# Patient Record
Sex: FEMALE | Race: WHITE | Employment: OTHER | URBAN - METROPOLITAN AREA
[De-identification: names, ages, dates, MRNs, and addresses within clinical notes are randomized per-mention and may not be internally consistent; named-entity substitution may affect disease eponyms.]

---

## 2020-02-03 ENCOUNTER — APPOINTMENT (OUTPATIENT)
Dept: CT IMAGING | Age: 70
End: 2020-02-03
Payer: MEDICARE

## 2020-02-03 ENCOUNTER — HOSPITAL ENCOUNTER (EMERGENCY)
Age: 70
Discharge: HOME | End: 2020-02-03
Payer: MEDICARE

## 2020-02-03 VITALS
SYSTOLIC BLOOD PRESSURE: 117 MMHG | RESPIRATION RATE: 18 BRPM | WEIGHT: 127 LBS | HEART RATE: 88 BPM | OXYGEN SATURATION: 98 % | DIASTOLIC BLOOD PRESSURE: 78 MMHG | BODY MASS INDEX: 22.5 KG/M2 | TEMPERATURE: 98.2 F | HEIGHT: 63 IN

## 2020-02-03 LAB
ALBUMIN SERPL-MCNC: 4.9 G/DL (ref 3.5–4.6)
ALP BLD-CCNC: 81 U/L (ref 40–130)
ALT SERPL-CCNC: 45 U/L (ref 0–33)
ANION GAP SERPL CALCULATED.3IONS-SCNC: 11 MEQ/L (ref 9–15)
AST SERPL-CCNC: 61 U/L (ref 0–35)
BASOPHILS ABSOLUTE: 0.1 K/UL (ref 0–0.2)
BASOPHILS RELATIVE PERCENT: 1.1 %
BILIRUB SERPL-MCNC: 0.4 MG/DL (ref 0.2–0.7)
BILIRUBIN URINE: NEGATIVE
BLOOD, URINE: NEGATIVE
BUN BLDV-MCNC: 9 MG/DL (ref 8–23)
CALCIUM SERPL-MCNC: 9.4 MG/DL (ref 8.5–9.9)
CHLORIDE BLD-SCNC: 106 MEQ/L (ref 95–107)
CLARITY: CLEAR
CO2: 28 MEQ/L (ref 20–31)
COLOR: YELLOW
CREAT SERPL-MCNC: 0.43 MG/DL (ref 0.5–0.9)
EKG ATRIAL RATE: 80 BPM
EKG P AXIS: 51 DEGREES
EKG P-R INTERVAL: 152 MS
EKG Q-T INTERVAL: 394 MS
EKG QRS DURATION: 74 MS
EKG QTC CALCULATION (BAZETT): 454 MS
EKG R AXIS: -7 DEGREES
EKG T AXIS: 33 DEGREES
EKG VENTRICULAR RATE: 80 BPM
EOSINOPHILS ABSOLUTE: 0 K/UL (ref 0–0.7)
EOSINOPHILS RELATIVE PERCENT: 0.1 %
GFR AFRICAN AMERICAN: >60
GFR NON-AFRICAN AMERICAN: >60
GLOBULIN: 3.2 G/DL (ref 2.3–3.5)
GLUCOSE BLD-MCNC: 98 MG/DL (ref 70–99)
GLUCOSE URINE: NEGATIVE MG/DL
HCT VFR BLD CALC: 42.3 % (ref 37–47)
HEMOGLOBIN: 14.7 G/DL (ref 12–16)
KETONES, URINE: NEGATIVE MG/DL
LEUKOCYTE ESTERASE, URINE: NEGATIVE
LIPASE: 36 U/L (ref 12–95)
LYMPHOCYTES ABSOLUTE: 1.3 K/UL (ref 1–4.8)
LYMPHOCYTES RELATIVE PERCENT: 26.5 %
MCH RBC QN AUTO: 34.7 PG (ref 27–31.3)
MCHC RBC AUTO-ENTMCNC: 34.6 % (ref 33–37)
MCV RBC AUTO: 100.2 FL (ref 82–100)
MONOCYTES ABSOLUTE: 0.4 K/UL (ref 0.2–0.8)
MONOCYTES RELATIVE PERCENT: 7.4 %
NEUTROPHILS ABSOLUTE: 3.2 K/UL (ref 1.4–6.5)
NEUTROPHILS RELATIVE PERCENT: 64.9 %
NITRITE, URINE: NEGATIVE
PDW BLD-RTO: 13.8 % (ref 11.5–14.5)
PH UA: 7 (ref 5–9)
PLATELET # BLD: 215 K/UL (ref 130–400)
POC CREATININE WHOLE BLOOD: 0.8
POTASSIUM SERPL-SCNC: 4.1 MEQ/L (ref 3.4–4.9)
PROTEIN UA: NEGATIVE MG/DL
RBC # BLD: 4.22 M/UL (ref 4.2–5.4)
SODIUM BLD-SCNC: 145 MEQ/L (ref 135–144)
SPECIFIC GRAVITY UA: 1.09 (ref 1–1.03)
TOTAL PROTEIN: 8.1 G/DL (ref 6.3–8)
TROPONIN: <0.01 NG/ML (ref 0–0.01)
URINE REFLEX TO CULTURE: NORMAL
UROBILINOGEN, URINE: 0.2 E.U./DL
WBC # BLD: 4.9 K/UL (ref 4.8–10.8)

## 2020-02-03 PROCEDURE — 99284 EMERGENCY DEPT VISIT MOD MDM: CPT

## 2020-02-03 PROCEDURE — 74177 CT ABD & PELVIS W/CONTRAST: CPT

## 2020-02-03 PROCEDURE — 84484 ASSAY OF TROPONIN QUANT: CPT

## 2020-02-03 PROCEDURE — 6360000004 HC RX CONTRAST MEDICATION: Performed by: PHYSICIAN ASSISTANT

## 2020-02-03 PROCEDURE — 85025 COMPLETE CBC W/AUTO DIFF WBC: CPT

## 2020-02-03 PROCEDURE — 6360000002 HC RX W HCPCS: Performed by: PHYSICIAN ASSISTANT

## 2020-02-03 PROCEDURE — 36415 COLL VENOUS BLD VENIPUNCTURE: CPT

## 2020-02-03 PROCEDURE — 81003 URINALYSIS AUTO W/O SCOPE: CPT

## 2020-02-03 PROCEDURE — 80053 COMPREHEN METABOLIC PANEL: CPT

## 2020-02-03 PROCEDURE — 71275 CT ANGIOGRAPHY CHEST: CPT

## 2020-02-03 PROCEDURE — 93005 ELECTROCARDIOGRAM TRACING: CPT | Performed by: PHYSICIAN ASSISTANT

## 2020-02-03 PROCEDURE — 96375 TX/PRO/DX INJ NEW DRUG ADDON: CPT

## 2020-02-03 PROCEDURE — 96374 THER/PROPH/DIAG INJ IV PUSH: CPT

## 2020-02-03 PROCEDURE — 83690 ASSAY OF LIPASE: CPT

## 2020-02-03 RX ORDER — SODIUM CHLORIDE 0.9 % (FLUSH) 0.9 %
10 SYRINGE (ML) INJECTION ONCE
Status: DISCONTINUED | OUTPATIENT
Start: 2020-02-03 | End: 2020-02-03 | Stop reason: HOSPADM

## 2020-02-03 RX ORDER — HYDROCODONE BITARTRATE AND ACETAMINOPHEN 5; 325 MG/1; MG/1
1 TABLET ORAL EVERY 6 HOURS PRN
Qty: 12 TABLET | Refills: 0 | Status: SHIPPED | OUTPATIENT
Start: 2020-02-03 | End: 2020-02-06

## 2020-02-03 RX ORDER — ONDANSETRON 2 MG/ML
4 INJECTION INTRAMUSCULAR; INTRAVENOUS
Status: COMPLETED | OUTPATIENT
Start: 2020-02-03 | End: 2020-02-03

## 2020-02-03 RX ORDER — KETOROLAC TROMETHAMINE 15 MG/ML
15 INJECTION, SOLUTION INTRAMUSCULAR; INTRAVENOUS ONCE
Status: COMPLETED | OUTPATIENT
Start: 2020-02-03 | End: 2020-02-03

## 2020-02-03 RX ORDER — MORPHINE SULFATE 2 MG/ML
2 INJECTION, SOLUTION INTRAMUSCULAR; INTRAVENOUS
Status: COMPLETED | OUTPATIENT
Start: 2020-02-03 | End: 2020-02-03

## 2020-02-03 RX ADMIN — MORPHINE SULFATE 2 MG: 2 INJECTION, SOLUTION INTRAMUSCULAR; INTRAVENOUS at 16:02

## 2020-02-03 RX ADMIN — IOPAMIDOL 100 ML: 755 INJECTION, SOLUTION INTRAVENOUS at 16:24

## 2020-02-03 RX ADMIN — KETOROLAC TROMETHAMINE 15 MG: 15 INJECTION, SOLUTION INTRAMUSCULAR; INTRAVENOUS at 16:02

## 2020-02-03 RX ADMIN — ONDANSETRON 4 MG: 2 INJECTION INTRAMUSCULAR; INTRAVENOUS at 16:01

## 2020-02-03 ASSESSMENT — PAIN DESCRIPTION - DESCRIPTORS: DESCRIPTORS: SHOOTING

## 2020-02-03 ASSESSMENT — ENCOUNTER SYMPTOMS
BACK PAIN: 1
VOICE CHANGE: 0
ABDOMINAL DISTENTION: 0
COLOR CHANGE: 1
APNEA: 0
ANAL BLEEDING: 0
PHOTOPHOBIA: 0
ABDOMINAL PAIN: 1
NAUSEA: 0
VOMITING: 0
EYE DISCHARGE: 0

## 2020-02-03 ASSESSMENT — PAIN SCALES - GENERAL
PAINLEVEL_OUTOF10: 8
PAINLEVEL_OUTOF10: 8

## 2020-02-03 ASSESSMENT — PAIN DESCRIPTION - FREQUENCY: FREQUENCY: INTERMITTENT

## 2020-02-03 ASSESSMENT — PAIN DESCRIPTION - LOCATION: LOCATION: FLANK

## 2020-02-03 ASSESSMENT — PAIN DESCRIPTION - PAIN TYPE: TYPE: ACUTE PAIN

## 2020-02-03 ASSESSMENT — PAIN DESCRIPTION - ORIENTATION: ORIENTATION: RIGHT

## 2020-02-03 NOTE — ED PROVIDER NOTES
[address] [facility] ED  eMERGENCY dEPARTMENT eNCOUnter      Pt Name: Anel Mcdonough  MRN: [id number]  [place] 1950  Date of evaluation: 2/3/2020  Provider: Saeed Valdez Dr       Chief Complaint   Patient presents with    Flank Pain     right flank pain that started in the middle of the night         HISTORY OF PRESENT ILLNESS   (Location/Symptom, Timing/Onset,Context/Setting, Quality, Duration, Modifying Factors, Severity)  Note limiting factors. Anel Mcdonough is a 71 y.o. female who presents to the emergency department presents with right-sided back flank and abdominal pain denies any injury denies fever chills nausea vomiting. Denies any falls denies history of kidney stones denies bleeding with urination rectal bleeding. States this started last night worse with touch or motion nothing improves symptoms. HPI    NursingNotes were reviewed. REVIEW OF SYSTEMS    (2-9 systems for level 4, 10 or more for level 5)     Review of Systems   Constitutional: Negative for activity change, appetite change, fever and unexpected weight change. HENT: Negative for ear discharge, nosebleeds and voice change. Eyes: Negative for photophobia and discharge. Respiratory: Negative for apnea. Cardiovascular: Negative for chest pain. Gastrointestinal: Positive for abdominal pain. Negative for abdominal distention, anal bleeding, nausea and vomiting. Endocrine: Negative for cold intolerance, heat intolerance and polyphagia. Genitourinary: Positive for flank pain. Negative for genital sores. Musculoskeletal: Positive for back pain. Negative for joint swelling. Skin: Positive for color change. Negative for pallor. Allergic/Immunologic: Negative for immunocompromised state. Neurological: Negative for seizures and facial asymmetry. Hematological: Does not bruise/bleed easily. Psychiatric/Behavioral: Negative for behavioral problems, self-injury and sleep disturbance.    All other systems reviewed and are negative. Except as noted above the remainder of the review of systems was reviewed and negative. PAST MEDICAL HISTORY   No past medical history on file. SURGICALHISTORY     No past surgical history on file. CURRENT MEDICATIONS       Previous Medications    No medications on file       ALLERGIES     Clindamycin/lincomycin    FAMILY HISTORY     No family history on file.        SOCIAL HISTORY       Social History     Socioeconomic History    Marital status:      Spouse name: Not on file    Number of children: Not on file    Years of education: Not on file    Highest education level: Not on file   Occupational History    Not on file   Social Needs    Financial resource strain: Not on file    Food insecurity:     Worry: Not on file     Inability: Not on file    Transportation needs:     Medical: Not on file     Non-medical: Not on file   Tobacco Use    Smoking status: Not on file   Substance and Sexual Activity    Alcohol use: Not on file    Drug use: Not on file    Sexual activity: Not on file   Lifestyle    Physical activity:     Days per week: Not on file     Minutes per session: Not on file    Stress: Not on file   Relationships    Social connections:     Talks on phone: Not on file     Gets together: Not on file     Attends Congregation service: Not on file     Active member of club or organization: Not on file     Attends meetings of clubs or organizations: Not on file     Relationship status: Not on file    Intimate partner violence:     Fear of current or ex partner: Not on file     Emotionally abused: Not on file     Physically abused: Not on file     Forced sexual activity: Not on file   Other Topics Concern    Not on file   Social History Narrative    Not on file       SCREENINGS      @[facility]([id number])@      PHYSICAL EXAM    (up to 7 for level 4, 8 or more for level 5)     ED Triage Vitals [02/03/20 1500]   BP Temp Temp Source Pulse Resp SpO2 Height Weight   118/73 98.2 °F (36.8 °C) Oral 91 18 96 % 5' 3\" (1.6 m) 127 lb (57.6 kg)       Physical Exam  Vitals signs and nursing note reviewed. Constitutional:       General: She is not in acute distress. Appearance: She is well-developed. HENT:      Head: Normocephalic and atraumatic. Right Ear: Tympanic membrane normal.      Left Ear: Tympanic membrane normal.      Nose: Nose normal.      Mouth/Throat:      Mouth: Mucous membranes are moist.   Eyes:      General:         Right eye: No discharge. Left eye: No discharge. Pupils: Pupils are equal, round, and reactive to light. Neck:      Musculoskeletal: Normal range of motion and neck supple. Cardiovascular:      Rate and Rhythm: Normal rate and regular rhythm. Pulses: Normal pulses. Heart sounds: Normal heart sounds. Pulmonary:      Effort: Pulmonary effort is normal. No respiratory distress. Breath sounds: Normal breath sounds. No stridor. No wheezing or rhonchi. Abdominal:      General: Bowel sounds are normal. There is no distension. Palpations: Abdomen is soft. Tenderness: There is no abdominal tenderness. Musculoskeletal: Normal range of motion. General: Tenderness present. Arms:    Skin:     General: Skin is warm. Findings: Bruising present. No erythema. Neurological:      Mental Status: She is alert and oriented to person, place, and time. Psychiatric:         Mood and Affect: Mood normal.         DIAGNOSTIC RESULTS     EKG: All EKG's are interpreted by the Emergency Department Physician who either signs or Co-signsthis chart in the absence of a cardiologist.    EKG normal sinus rhythm rate 80neg ST segment elevation.     RADIOLOGY:   Non-plain filmimages such as CT, Ultrasound and MRI are read by the radiologist. Plain radiographic images are visualized and preliminarily interpreted by the emergency physician with the below findings:    See radiology reports. Interpretation per the Radiologist below, if available at the time ofthis note:    CTA Chest W WO  (PE study)   Final Result      No CT evidence of pulmonary embolism. No acute process in the thorax. Chronic-appearing compression deformity of L2.               CT ABDOMEN PELVIS W IV CONTRAST Additional Contrast? None   Final Result      No hydronephrosis or nephrolithiasis or other acute process in the abdomen/pelvis. Chronic appearing compression deformities of L2 and L3 as detailed  (counting reference is T12 as last rib bearing vertebral body, with lumbarization of S1 and last well formed disc space S1-S2). .               ==========               ED BEDSIDE ULTRASOUND:   Performed by ED Physician - none    LABS:  Labs Reviewed   CBC WITH AUTO DIFFERENTIAL - Abnormal; Notable for the following components:       Result Value    .2 (*)     MCH 34.7 (*)     All other components within normal limits   COMPREHENSIVE METABOLIC PANEL - Abnormal; Notable for the following components:    Sodium 145 (*)     CREATININE 0.43 (*)     Total Protein 8.1 (*)     Alb 4.9 (*)     ALT 45 (*)     AST 61 (*)     All other components within normal limits   POCT CREATININE - URINE - Normal   URINE RT REFLEX TO CULTURE   TROPONIN   LIPASE       All other labs were within normal range or not returned as of this dictation.     EMERGENCY DEPARTMENT COURSE and DIFFERENTIAL DIAGNOSIS/MDM:   Vitals:    Vitals:    02/03/20 1500 02/03/20 1741   BP: 118/73 117/78   Pulse: 91 88   Resp: 18 18   Temp: 98.2 °F (36.8 °C)    TempSrc: Oral    SpO2: 96% 98%   Weight: 127 lb (57.6 kg)    Height: 5' 3\" (1.6 m)             MDM  Number of Diagnoses or Management Options  Acute right-sided low back pain, unspecified whether sciatica present:   Contusion of right side of back, initial encounter:   Dehydration:   Right flank pain:   Diagnosis management comments: Patient noted to have a bruise subsequent exams it is

## 2020-02-03 NOTE — ED NOTES
Patient states unable to give urine refused anything invasive.  Will try soon     Yuliana Huang RN  02/03/20 0066

## 2020-02-03 NOTE — ED NOTES
Patient to ct via cart. Tolerated well. No acute distress noted.       Kendrick Newton  02/03/20 2970

## 2020-02-04 LAB
GFR AFRICAN AMERICAN: >60
GFR NON-AFRICAN AMERICAN: >60
PERFORMED ON: NORMAL
POC CREATININE: 0.8 MG/DL (ref 0.6–1.2)
POC SAMPLE TYPE: NORMAL

## 2020-02-05 PROCEDURE — 93010 ELECTROCARDIOGRAM REPORT: CPT | Performed by: INTERNAL MEDICINE

## 2020-02-18 ASSESSMENT — ENCOUNTER SYMPTOMS
ABDOMINAL DISTENTION: 0
COLOR CHANGE: 1
VOMITING: 0
BACK PAIN: 1
ABDOMINAL PAIN: 1
ANAL BLEEDING: 0
PHOTOPHOBIA: 0
APNEA: 0
NAUSEA: 0
VOICE CHANGE: 0
EYE DISCHARGE: 0

## 2023-06-02 PROBLEM — R35.0 URINARY FREQUENCY: Status: ACTIVE | Noted: 2023-06-02

## 2023-06-02 PROBLEM — R87.613 HGSIL ON PAP SMEAR OF CERVIX: Status: ACTIVE | Noted: 2023-06-02

## 2023-06-02 PROBLEM — R94.31 ABNORMAL ELECTROCARDIOGRAPHY: Status: ACTIVE | Noted: 2023-06-02

## 2023-06-02 PROBLEM — Z96.652 STATUS POST LEFT KNEE REPLACEMENT: Status: ACTIVE | Noted: 2023-06-02

## 2023-06-02 PROBLEM — R92.8 ABNORMAL MAMMOGRAM: Status: ACTIVE | Noted: 2023-06-02

## 2023-06-02 PROBLEM — M81.0 OSTEOPOROSIS: Status: ACTIVE | Noted: 2023-06-02

## 2023-06-02 PROBLEM — M54.9 BACK PAIN: Status: ACTIVE | Noted: 2023-06-02

## 2023-06-02 PROBLEM — M25.561 RIGHT KNEE PAIN: Status: ACTIVE | Noted: 2023-06-02

## 2023-06-02 PROBLEM — D75.89 MACROCYTOSIS: Status: ACTIVE | Noted: 2023-06-02

## 2023-06-02 PROBLEM — N39.41 URGE INCONTINENCE: Status: ACTIVE | Noted: 2023-06-02

## 2023-06-02 PROBLEM — R53.83 FATIGUE: Status: ACTIVE | Noted: 2023-06-02

## 2023-06-02 PROBLEM — J45.909 AB (ASTHMATIC BRONCHITIS) (HHS-HCC): Status: ACTIVE | Noted: 2023-06-02

## 2023-06-02 PROBLEM — K57.90 DIVERTICULOSIS: Status: ACTIVE | Noted: 2023-06-02

## 2023-06-02 PROBLEM — K63.5 COLON POLYPS: Status: ACTIVE | Noted: 2023-06-02

## 2023-06-02 PROBLEM — E78.5 HYPERLIPIDEMIA: Status: ACTIVE | Noted: 2023-06-02

## 2023-06-02 PROBLEM — M20.42 ACQUIRED HAMMERTOE OF LEFT FOOT: Status: ACTIVE | Noted: 2023-06-02

## 2023-06-02 PROBLEM — N32.81 OAB (OVERACTIVE BLADDER): Status: ACTIVE | Noted: 2023-06-02

## 2023-06-02 PROBLEM — K51.90 ULCERATIVE COLITIS (MULTI): Status: ACTIVE | Noted: 2023-06-02

## 2023-06-02 RX ORDER — PNV NO.95/FERROUS FUM/FOLIC AC 28MG-0.8MG
TABLET ORAL
COMMUNITY
Start: 2015-12-24

## 2023-06-02 RX ORDER — MULTIVITAMIN
TABLET ORAL
COMMUNITY

## 2023-06-02 RX ORDER — MESALAMINE 0.38 G/1
3 CAPSULE, EXTENDED RELEASE ORAL DAILY
COMMUNITY

## 2023-06-02 RX ORDER — FERROUS SULFATE 325(65) MG
1 TABLET ORAL DAILY
COMMUNITY
Start: 2017-09-05

## 2023-06-02 RX ORDER — ALENDRONATE SODIUM 70 MG/1
TABLET ORAL
COMMUNITY
Start: 2019-05-23 | End: 2023-07-28 | Stop reason: SDUPTHER

## 2023-06-02 RX ORDER — ATORVASTATIN CALCIUM 10 MG/1
1 TABLET, FILM COATED ORAL NIGHTLY
COMMUNITY
Start: 2019-05-07 | End: 2023-07-28 | Stop reason: SDUPTHER

## 2023-06-05 ENCOUNTER — OFFICE VISIT (OUTPATIENT)
Dept: PRIMARY CARE | Facility: CLINIC | Age: 73
End: 2023-06-05
Payer: MEDICARE

## 2023-06-05 VITALS
WEIGHT: 137 LBS | TEMPERATURE: 98.2 F | DIASTOLIC BLOOD PRESSURE: 72 MMHG | BODY MASS INDEX: 25.06 KG/M2 | SYSTOLIC BLOOD PRESSURE: 136 MMHG

## 2023-06-05 DIAGNOSIS — Z78.0 POSTMENOPAUSAL: ICD-10-CM

## 2023-06-05 DIAGNOSIS — M81.0 OSTEOPOROSIS, UNSPECIFIED OSTEOPOROSIS TYPE, UNSPECIFIED PATHOLOGICAL FRACTURE PRESENCE: ICD-10-CM

## 2023-06-05 DIAGNOSIS — Z00.00 HEALTH CARE MAINTENANCE: Primary | ICD-10-CM

## 2023-06-05 DIAGNOSIS — Z12.31 ENCOUNTER FOR SCREENING MAMMOGRAM FOR MALIGNANT NEOPLASM OF BREAST: ICD-10-CM

## 2023-06-05 DIAGNOSIS — E78.5 HYPERLIPIDEMIA, UNSPECIFIED HYPERLIPIDEMIA TYPE: ICD-10-CM

## 2023-06-05 DIAGNOSIS — R00.2 PALPITATIONS: ICD-10-CM

## 2023-06-05 PROBLEM — J45.909 AB (ASTHMATIC BRONCHITIS) (HHS-HCC): Status: RESOLVED | Noted: 2023-06-02 | Resolved: 2023-06-05

## 2023-06-05 PROBLEM — R53.83 FATIGUE: Status: RESOLVED | Noted: 2023-06-02 | Resolved: 2023-06-05

## 2023-06-05 LAB
POC HDL CHOLESTEROL: 63 MG/DL (ref 0–50)
POC LDL CHOLESTEROL: 57 MG/DL (ref 0–100)
POC NON-HDL CHOLESTEROL: 73 MG/DL (ref 0–130)
POC TOTAL CHOLESTEROL/HDL RATIO: 2.2 (ref 0–4.5)
POC TOTAL CHOLESTEROL: 137 MG/DL (ref 0–199)
POC TRIGLYCERIDES: 80 MG/DL (ref 0–150)

## 2023-06-05 PROCEDURE — 99214 OFFICE O/P EST MOD 30 MIN: CPT | Performed by: FAMILY MEDICINE

## 2023-06-05 PROCEDURE — 93000 ELECTROCARDIOGRAM COMPLETE: CPT | Performed by: FAMILY MEDICINE

## 2023-06-05 PROCEDURE — 80061 LIPID PANEL: CPT | Performed by: FAMILY MEDICINE

## 2023-06-05 PROCEDURE — 1160F RVW MEDS BY RX/DR IN RCRD: CPT | Performed by: FAMILY MEDICINE

## 2023-06-05 PROCEDURE — 1159F MED LIST DOCD IN RCRD: CPT | Performed by: FAMILY MEDICINE

## 2023-06-05 PROCEDURE — 1036F TOBACCO NON-USER: CPT | Performed by: FAMILY MEDICINE

## 2023-06-05 NOTE — PATIENT INSTRUCTIONS
Follow up fasting (no alcohol for 48 hours and just water for 14 hours) in six months for your next routine appointment.  In general, take any medications on schedule (except for types of Insulin).

## 2023-06-05 NOTE — PROGRESS NOTES
Subjective   Patient ID: [id number]     Rosalba Lewis is a 73 y.o. female who presents for Med Management.    HPI  Is concerned about systolic's in the 140's and gettingpolpitations after eating and in the middle of the night  Review of Systems  Cardiovascular- No chest pain or pressure, nausea, diaphoresis, paresthesias, dizziness, or syncope with or without exertion;  getting palpitations;  has given up caffeine but still doing herbal teas etc.  GEN-denies, fever, weakness or myalgias; no unexplained fever or chills  OPTH-No dry eyes, itchy eyes, or blurry vision   ENT-No hearing loss, tinnitus or vertigo;  ha a lot of phlegm in the morning  NECK-no stiffness, swelling or pain  PSYCH-No complaints regarding libido, appetite, memory or concentration;  no drug use or alcohol usage over six per week;  not getting lost  and no issues with numbers  SLEEP-No complaints of insomnia, apnea or restless legs  ALL/IMMUN-No history of sneezing or itching  HEM-No unexplained bruising or bleeding    Objective     /72 (BP Location: Left arm, Patient Position: Sitting)   Temp 36.8 °C (98.2 °F) (Oral)   Wt 62.1 kg (137 lb)   BMI 25.06 kg/m²      Physical Exam  Eyes-pupils equal round, reactive to light and accommodation, fundi with normal cup/disc ratio, conjunctiva without redness or discharge  General- well defined, well nourished, well hydrated individual in NAD  Skin- normal color and turgor; without nail pitting  Head-normocephalic without masses or tenderness  Ears-normal pinnae, and canals, with normal landmarks and light reflex of tympanic membranes bilaterally  Nose-septum in the midline, normal mucosa bilaterally  Throat- without erythema or exudate, uvula in midlineNeck-supple without lymphadenopathy or thyromegaly; no carotid bruits  Heart- regular rate and rhythm, normal s1 and s2 without murmur or gallop  Lungs-clear to auscultation  Abdomen-soft, positive bowel sounds, without masses, HSmegaly or pain    Assessment/Plan     Problem List Items Addressed This Visit          Circulatory    Palpitations    Relevant Orders    Stress Test Only    ECG 12 lead       Musculoskeletal    Osteoporosis       Other    Hyperlipidemia    Relevant Orders    POCT Lipid Panel manually resulted    Encounter for screening mammogram for malignant neoplasm of breast - Primary    Relevant Orders    BI mammo bilateral screening tomosynthesis    Postmenopausal    Relevant Orders    XR DEXA bone density     Follow up fasting (no alcohol for 48 hours and just water for 14 hours) in six months for your next routine appointment.  In general, take any medications on schedule (except for types of Insulin).    Prieto Patricia MD

## 2023-06-12 ENCOUNTER — APPOINTMENT (OUTPATIENT)
Dept: PRIMARY CARE | Facility: CLINIC | Age: 73
End: 2023-06-12
Payer: MEDICARE

## 2023-07-17 ENCOUNTER — TELEPHONE (OUTPATIENT)
Dept: PRIMARY CARE | Facility: CLINIC | Age: 73
End: 2023-07-17
Payer: MEDICARE

## 2023-07-17 DIAGNOSIS — R92.8 ABNORMAL MAMMOGRAM: Primary | ICD-10-CM

## 2023-07-19 NOTE — TELEPHONE ENCOUNTER
I contacted pt and reviewed mammogram result and need for bilateral diagnostic mammogram, order placed. Pt to call and schedule,  Thank you,  Keyla Waller, DO

## 2023-07-19 NOTE — TELEPHONE ENCOUNTER
Pt is Dr. RUIZ 's pt and Dr. MCKEON was covering yesterday for him and he left a message for the pt to call back. Pt is calling back can you please review pts mammogram and call her with the results. Thank you.

## 2023-07-22 DIAGNOSIS — N64.89 ASYMMETRICAL BREASTS: Primary | ICD-10-CM

## 2023-07-28 ENCOUNTER — TELEPHONE (OUTPATIENT)
Dept: PRIMARY CARE | Facility: CLINIC | Age: 73
End: 2023-07-28
Payer: MEDICARE

## 2023-07-28 DIAGNOSIS — E78.5 HYPERLIPIDEMIA, UNSPECIFIED HYPERLIPIDEMIA TYPE: ICD-10-CM

## 2023-07-28 DIAGNOSIS — M81.0 OSTEOPOROSIS, UNSPECIFIED OSTEOPOROSIS TYPE, UNSPECIFIED PATHOLOGICAL FRACTURE PRESENCE: ICD-10-CM

## 2023-07-28 RX ORDER — ALENDRONATE SODIUM 70 MG/1
70 TABLET ORAL
Qty: 12 TABLET | Refills: 0 | Status: SHIPPED | OUTPATIENT
Start: 2023-07-28 | End: 2023-12-28 | Stop reason: SDUPTHER

## 2023-07-28 RX ORDER — ATORVASTATIN CALCIUM 10 MG/1
10 TABLET, FILM COATED ORAL NIGHTLY
Qty: 90 TABLET | Refills: 0 | Status: SHIPPED | OUTPATIENT
Start: 2023-07-28 | End: 2023-11-04 | Stop reason: SDUPTHER

## 2023-07-28 NOTE — TELEPHONE ENCOUNTER
Refill    Alendronate (fosamax) 70 mg tablet, take 1 tablet once weekly  LR: 12/12/22 84 days 1 refill    Atorvastatin (lipitor) 10 mg tablet, take 1 tablet by mouth everyday at bedtime  LR: 12/12/22 90 days 1 refill    Pharm: [facility]   [phone]    LV: 6/12/23  NV: 12/5/23

## 2023-10-17 ENCOUNTER — TELEPHONE (OUTPATIENT)
Dept: OBSTETRICS AND GYNECOLOGY | Facility: CLINIC | Age: 73
End: 2023-10-17
Payer: MEDICARE

## 2023-10-17 NOTE — TELEPHONE ENCOUNTER
Pt was last seen 6/5/19 She has a hx of abnormal paps and a Leep. 2019 Pap was Neg, Neg. Pt called to see if she is due for an appt. She stated that she is having occ minor pelvic discomfort and is sexually active again with a new partner. Pt was advised to schedule an appt. For JANNA and to discuss her concerns. Transferred to the  for an appt.

## 2023-11-03 ENCOUNTER — TELEPHONE (OUTPATIENT)
Dept: PRIMARY CARE | Facility: CLINIC | Age: 73
End: 2023-11-03
Payer: MEDICARE

## 2023-11-03 DIAGNOSIS — E78.5 HYPERLIPIDEMIA, UNSPECIFIED HYPERLIPIDEMIA TYPE: ICD-10-CM

## 2023-11-03 RX ORDER — ATORVASTATIN CALCIUM 10 MG/1
10 TABLET, FILM COATED ORAL NIGHTLY
Qty: 90 TABLET | Refills: 0 | OUTPATIENT
Start: 2023-11-03

## 2023-11-03 NOTE — TELEPHONE ENCOUNTER
Refill:    Atorvastatin 10mg daily    Pharm: [facility] # [phone]    LR: 07/28/23 qty 90 no refills  LV: 06/05/23  NV: 12/08/23

## 2023-11-06 RX ORDER — ATORVASTATIN CALCIUM 10 MG/1
10 TABLET, FILM COATED ORAL NIGHTLY
Qty: 90 TABLET | Refills: 1 | Status: SHIPPED | OUTPATIENT
Start: 2023-11-06 | End: 2024-05-04

## 2023-12-05 ENCOUNTER — APPOINTMENT (OUTPATIENT)
Dept: PRIMARY CARE | Facility: CLINIC | Age: 73
End: 2023-12-05
Payer: MEDICARE

## 2023-12-08 ENCOUNTER — APPOINTMENT (OUTPATIENT)
Dept: PRIMARY CARE | Facility: CLINIC | Age: 73
End: 2023-12-08
Payer: MEDICARE

## 2023-12-12 ENCOUNTER — OFFICE VISIT (OUTPATIENT)
Dept: DERMATOLOGY | Facility: CLINIC | Age: 73
End: 2023-12-12
Payer: MEDICARE

## 2023-12-12 DIAGNOSIS — L81.4 LENTIGO: ICD-10-CM

## 2023-12-12 DIAGNOSIS — L57.8 PHOTOAGING OF SKIN: ICD-10-CM

## 2023-12-12 DIAGNOSIS — Z12.83 ENCOUNTER FOR SCREENING FOR MALIGNANT NEOPLASM OF SKIN: Primary | ICD-10-CM

## 2023-12-12 DIAGNOSIS — L57.0 ACTINIC KERATOSIS: ICD-10-CM

## 2023-12-12 DIAGNOSIS — D48.5 NEOPLASM OF UNCERTAIN BEHAVIOR OF SKIN: ICD-10-CM

## 2023-12-12 DIAGNOSIS — L82.1 SEBORRHEIC KERATOSIS: ICD-10-CM

## 2023-12-12 DIAGNOSIS — D18.01 HEMANGIOMA OF SKIN: ICD-10-CM

## 2023-12-12 PROCEDURE — 17000 DESTRUCT PREMALG LESION: CPT | Performed by: DERMATOLOGY

## 2023-12-12 PROCEDURE — 1159F MED LIST DOCD IN RCRD: CPT | Performed by: DERMATOLOGY

## 2023-12-12 PROCEDURE — 99203 OFFICE O/P NEW LOW 30 MIN: CPT | Performed by: DERMATOLOGY

## 2023-12-12 PROCEDURE — 1036F TOBACCO NON-USER: CPT | Performed by: DERMATOLOGY

## 2023-12-12 PROCEDURE — 1160F RVW MEDS BY RX/DR IN RCRD: CPT | Performed by: DERMATOLOGY

## 2023-12-12 ASSESSMENT — DERMATOLOGY QUALITY OF LIFE (QOL) ASSESSMENT
WHAT SINGLE SKIN CONDITION LISTED BELOW IS THE PATIENT ANSWERING THE QUALITY-OF-LIFE ASSESSMENT QUESTIONS ABOUT: NONE OF THE ABOVE
ARE THERE EXCLUSIONS OR EXCEPTIONS FOR THE QUALITY OF LIFE ASSESSMENT: NO
WHAT SINGLE SKIN CONDITION LISTED BELOW IS THE PATIENT ANSWERING THE QUALITY-OF-LIFE ASSESSMENT QUESTIONS ABOUT: NONE OF THE ABOVE
RATE HOW EMOTIONALLY BOTHERED YOU ARE BY YOUR SKIN PROBLEM (FOR EXAMPLE, WORRY, EMBARRASSMENT, FRUSTRATION): 0 - NEVER BOTHERED
RATE HOW BOTHERED YOU ARE BY SYMPTOMS OF YOUR SKIN PROBLEM (EG, ITCHING, STINGING BURNING, HURTING OR SKIN IRRITATION): 1
RATE HOW BOTHERED YOU ARE BY EFFECTS OF YOUR SKIN PROBLEMS ON YOUR ACTIVITIES (EG, GOING OUT, ACCOMPLISHING WHAT YOU WANT, WORK ACTIVITIES OR YOUR RELATIONSHIPS WITH OTHERS): 0 - NEVER BOTHERED
RATE HOW BOTHERED YOU ARE BY SYMPTOMS OF YOUR SKIN PROBLEM (EG, ITCHING, STINGING BURNING, HURTING OR SKIN IRRITATION): 1
RATE HOW BOTHERED YOU ARE BY EFFECTS OF YOUR SKIN PROBLEMS ON YOUR ACTIVITIES (EG, GOING OUT, ACCOMPLISHING WHAT YOU WANT, WORK ACTIVITIES OR YOUR RELATIONSHIPS WITH OTHERS): 0 - NEVER BOTHERED
RATE HOW EMOTIONALLY BOTHERED YOU ARE BY YOUR SKIN PROBLEM (FOR EXAMPLE, WORRY, EMBARRASSMENT, FRUSTRATION): 0 - NEVER BOTHERED
RATE HOW BOTHERED YOU ARE BY EFFECTS OF YOUR SKIN PROBLEMS ON YOUR ACTIVITIES (EG, GOING OUT, ACCOMPLISHING WHAT YOU WANT, WORK ACTIVITIES OR YOUR RELATIONSHIPS WITH OTHERS): 0 - NEVER BOTHERED
WHAT SINGLE SKIN CONDITION LISTED BELOW IS THE PATIENT ANSWERING THE QUALITY-OF-LIFE ASSESSMENT QUESTIONS ABOUT: NONE OF THE ABOVE
RATE HOW BOTHERED YOU ARE BY SYMPTOMS OF YOUR SKIN PROBLEM (EG, ITCHING, STINGING BURNING, HURTING OR SKIN IRRITATION): 1
RATE HOW EMOTIONALLY BOTHERED YOU ARE BY YOUR SKIN PROBLEM (FOR EXAMPLE, WORRY, EMBARRASSMENT, FRUSTRATION): 0 - NEVER BOTHERED

## 2023-12-12 ASSESSMENT — PATIENT GLOBAL ASSESSMENT (PGA)
WHAT IS THE PGA: PATIENT GLOBAL ASSESSMENT:  2 - MILD
PATIENT GLOBAL ASSESSMENT: PATIENT GLOBAL ASSESSMENT:  2 - MILD

## 2023-12-12 ASSESSMENT — DERMATOLOGY PATIENT ASSESSMENT
DO YOU USE SUNSCREEN: OCCASIONALLY
DO YOU USE A TANNING BED: YES, PREVIOUSLY
ARE YOU AN ORGAN TRANSPLANT RECIPIENT: NO
DO YOU HAVE ANY NEW OR CHANGING LESIONS: YES

## 2023-12-12 ASSESSMENT — ITCH NUMERIC RATING SCALE: HOW SEVERE IS YOUR ITCHING?: 0

## 2023-12-12 NOTE — PROGRESS NOTES
Subjective     Rosalba Lewis is a 73 y.o. female who presents for the following: Skin Check (Pt her for yearly FBSE. A couple spots on upper cutaneous lip. No hx of skin cancer.Fhx of NMSC.). Lesions on upper lip present for 2 weeks - same since they appeared.    Review of Systems:  No other skin or systemic complaints other than what is documented elsewhere in the note.    The following portions of the chart were reviewed this encounter and updated as appropriate:          Skin Cancer History  No skin cancer on file.      Specialty Problems    None       Objective   Well appearing patient in no apparent distress; mood and affect are within normal limits.    A focused skin examination was performed. All findings within normal limits unless otherwise noted below.    Assessment/Plan   1. Encounter for screening for malignant neoplasm of skin  No suspicious lesions noted on examination today    The risk of chronic, cumulative sun damage and risk of development of skin cancer was reviewed today.   The importance of sun protection was reviewed: including the use of a broad spectrum sunscreen that protects against both UVA/UVB rays, with ingredients such as Zinc oxide or titanium dioxide, wearing sun protective clothing and sun avoidance. We reviewed the warning signs of non-melanoma skin cancer and ABCDEs of melanoma  Please follow up should you notice any new or changing pre-existing skin lesion.    Related Procedures  Follow Up In Dermatology - Established Patient    2. Actinic keratosis  Left Dorsal Hand  Erythematous macules with gritty scale.    Lesions are due to cumulative sun damage over time and are pre-cancerous. They have a risk (although small in majority of patients) of developing into squamous cell carcinoma and therefore treatment recommendations were offered and discussed.   Treatments Discussed include LN2, photodynamic (blue light) therapy & topical chemotherapy creams, risks and benefits of each.      The risks and benefits of LN2 were reviewed including incomplete removal, crusting, blister hypo and/or hyperpigmentation, scarring and recurrence. Pt elected for LN2 today    Destr of lesion - Left Dorsal Hand  Complexity: simple    Destruction method: cryotherapy    Informed consent: discussed and consent obtained    Lesion destroyed using liquid nitrogen: Yes    Cryotherapy cycles:  1  Outcome: patient tolerated procedure well with no complications    Post-procedure details: wound care instructions given      3. Photoaging of skin  Mottled pigmentation with telangiectasias and brown reticular macules in sun exposed areas of the body.    The risk of chronic, cumulative sun damage and risk of development of skin cancer was reviewed today.   The importance of sun protection was reviewed: including the use of a broad spectrum sunscreen that protects against both UVA/UVB rays, with ingredients such as Zinc oxide or titanium dioxide, wearing sun protective clothing and sun avoidance. We reviewed the warning signs of non-melanoma skin cancer and ABCDEs of melanoma  Please follow up should you notice any new or changing pre-existing skin lesion.    4. Seborrheic keratosis  Brown, tan waxy macules and stuck on appearing papules and plaques    The benign nature of these skin lesions reviewed, reassure provided and no further treatment needed at this time.   These lesions can be removed, if symptomatic (itching, bleeding, rubbing on clothing, painful), otherwise removal is considered cosmetic.     5. Hemangioma of skin  Cherry red papules    The benign nature of these skin lesions were reviewed, no treatment is necessary.   Please follow up for any new or pre-existing lesion that is changing in size, shape, color, becomes painful, tender, itches or bleed.    6. Lentigo  Scattered tan macules in sun-exposed areas.    These are benign skin lesions due to sun exposure. They will darken in response to sun exposure. They  should be monitored for change in size, shape or color.  These lesions can be treated cosmetically with topical creams, liquid nitrogen and a variety of lasers.    7. Neoplasm of uncertain behavior of skin  Left Upper Cutaneous Lip  Eroded pink acneiform appearing papule    Lesion present x 2 weeks, benign appearing.    Monitor for incomplete resolution - if still present after 1 month should return for follow up.

## 2023-12-14 ENCOUNTER — APPOINTMENT (OUTPATIENT)
Dept: PRIMARY CARE | Facility: CLINIC | Age: 73
End: 2023-12-14
Payer: MEDICARE

## 2023-12-21 ENCOUNTER — APPOINTMENT (OUTPATIENT)
Dept: PRIMARY CARE | Facility: CLINIC | Age: 73
End: 2023-12-21
Payer: MEDICARE

## 2023-12-21 ENCOUNTER — OFFICE VISIT (OUTPATIENT)
Dept: OBSTETRICS AND GYNECOLOGY | Facility: CLINIC | Age: 73
End: 2023-12-21
Payer: MEDICARE

## 2023-12-21 VITALS
WEIGHT: 135 LBS | SYSTOLIC BLOOD PRESSURE: 116 MMHG | DIASTOLIC BLOOD PRESSURE: 60 MMHG | BODY MASS INDEX: 24.84 KG/M2 | HEIGHT: 62 IN

## 2023-12-21 DIAGNOSIS — Z01.419 WELL WOMAN EXAM: Primary | ICD-10-CM

## 2023-12-21 DIAGNOSIS — Z12.4 CERVICAL CANCER SCREENING: ICD-10-CM

## 2023-12-21 PROCEDURE — 1159F MED LIST DOCD IN RCRD: CPT | Performed by: OBSTETRICS & GYNECOLOGY

## 2023-12-21 PROCEDURE — 87624 HPV HI-RISK TYP POOLED RSLT: CPT

## 2023-12-21 PROCEDURE — G0101 CA SCREEN;PELVIC/BREAST EXAM: HCPCS | Performed by: OBSTETRICS & GYNECOLOGY

## 2023-12-21 PROCEDURE — 1160F RVW MEDS BY RX/DR IN RCRD: CPT | Performed by: OBSTETRICS & GYNECOLOGY

## 2023-12-21 PROCEDURE — 88141 CYTOPATH C/V INTERPRET: CPT | Performed by: PATHOLOGY

## 2023-12-21 PROCEDURE — 88175 CYTOPATH C/V AUTO FLUID REDO: CPT

## 2023-12-21 PROCEDURE — 1036F TOBACCO NON-USER: CPT | Performed by: OBSTETRICS & GYNECOLOGY

## 2023-12-21 ASSESSMENT — ENCOUNTER SYMPTOMS
ALLERGIC/IMMUNOLOGIC NEGATIVE: 1
ENDOCRINE NEGATIVE: 1
CONSTITUTIONAL NEGATIVE: 1
NEUROLOGICAL NEGATIVE: 1
HEMATOLOGIC/LYMPHATIC NEGATIVE: 1
GASTROINTESTINAL NEGATIVE: 1
EYES NEGATIVE: 1
MUSCULOSKELETAL NEGATIVE: 1
RESPIRATORY NEGATIVE: 1
CARDIOVASCULAR NEGATIVE: 1
PSYCHIATRIC NEGATIVE: 1

## 2023-12-21 NOTE — ASSESSMENT & PLAN NOTE
Thank you for your visit for well woman care.  A pap smear and HPV test was performed today. The results will be in MyChart in about three weeks.  You are due to have a mammogram performed. Please schedule.

## 2023-12-21 NOTE — PROGRESS NOTES
Subjective   Patient ID: Rosalba Lewis is a 73 y.o. female who presents for New Patient Visit (JANNA).  Health is good.        Review of Systems   Constitutional: Negative.    HENT: Negative.     Eyes: Negative.    Respiratory: Negative.     Cardiovascular: Negative.    Gastrointestinal: Negative.    Endocrine: Negative.    Genitourinary: Negative.    Musculoskeletal: Negative.    Skin: Negative.    Allergic/Immunologic: Negative.    Neurological: Negative.    Hematological: Negative.    Psychiatric/Behavioral: Negative.         Objective   Physical Exam  Constitutional:       Appearance: Normal appearance. She is normal weight.   HENT:      Head: Normocephalic.   Neck:      Thyroid: No thyroid mass or thyromegaly.   Pulmonary:      Effort: Pulmonary effort is normal.   Chest:   Breasts:     Right: Normal.      Left: Normal.   Abdominal:      Palpations: Abdomen is soft.   Genitourinary:     General: Normal vulva.      Exam position: Lithotomy position.      Cervix: Normal.      Uterus: Normal.       Adnexa: Right adnexa normal and left adnexa normal.   Musculoskeletal:         General: Normal range of motion.      Cervical back: Normal range of motion and neck supple.   Lymphadenopathy:      Upper Body:      Right upper body: No supraclavicular or axillary adenopathy.      Left upper body: No supraclavicular or axillary adenopathy.   Skin:     General: Skin is warm and dry.   Neurological:      General: No focal deficit present.      Mental Status: She is alert.   Psychiatric:         Mood and Affect: Mood normal.         Behavior: Behavior normal.         Thought Content: Thought content normal.         Judgment: Judgment normal.         Assessment/Plan   Problem List Items Addressed This Visit             ICD-10-CM       Genitourinary and Reproductive    Well woman exam - Primary Z01.419     Thank you for your visit for well woman care.  A pap smear and HPV test was performed today. The results will be in [place]  in about three weeks.  You are due to have a mammogram performed. Please schedule.                   Anju Moreland MD 12/21/23 10:30 AM

## 2023-12-27 PROBLEM — R92.8 ABNORMAL MAMMOGRAM: Status: RESOLVED | Noted: 2023-06-02 | Resolved: 2023-12-27

## 2023-12-27 PROBLEM — R94.31 ABNORMAL ELECTROCARDIOGRAPHY: Status: RESOLVED | Noted: 2023-06-02 | Resolved: 2023-12-27

## 2023-12-27 PROBLEM — Z01.419 WELL WOMAN EXAM: Status: RESOLVED | Noted: 2023-06-05 | Resolved: 2023-12-27

## 2023-12-27 NOTE — PROGRESS NOTES
Subjective   Reason for Visit: Rosalba Lewis is an 73 y.o. female here for a Medicare Wellness visit.     Past Medical, Surgical, and Family History reviewed and updated in chart.  In the past 2 weeks this patient has not felt down, depressed, hopeless, lost interest or pleasure in doing things or thought of harming herself or others. The patient has not fallen in the last six months and has no loose rugs,  uneven floors or poor lighting at home. Advance Care Planning discussion was held.  The patient has no spiritual/Cheondoism/cultural values or needs that we need to know. The patient does not feel threatened or abused physically, emotionally or sexually.  The patient feels safe in the home.  In the past month, there was not a day when the patient of anyone in the patient's family went hungry because there was not enough food.  The patient denies that they or the person with them has problems with hearing, speaking, reading, moving around or learning.  The patient states they are comfortable filling out medical forms.      Reviewed all medications by prescribing practitioner or clinical pharmacist (such as prescriptions, OTCs, herbal therapies and supplements) and documented in the medical record.    HPI  Taking meds as directed without tolerability or affordability issues; no complaints today.    Patient Care Team:  Prieto Patricia MD as PCP - General  Prieto Patricia MD as PCP - [employer]vantage PCP     Review of Systems  Cardiovascular- No chest pain or pressure, nausea, diaphoresis, paresthesias, dizziness, or syncope with or without exertion  Gastrointestinal-No blood in stool, tarry stools, pain, vomiting, heartburn, constipation or diarrhea  Pulmonary-No wheezing, coughing or shortness of breath  Urology-No urgency, blood in urine, or incontinence;  has persistent frequency,   Musculoskeletal-No locking, giving way/swelling of joints;  only occasionally has pain in right knee  Neurology- No daily  headaches, hx of concussion, falls in the last year or seizure  Allergy/Immunology-No history of sneezing or itching  Dermatology-No rashes, blanching or  change in any moles    Objective   Vitals:  /84   Wt 61.7 kg (136 lb)   BMI 24.87 kg/m²       Physical Exam  Neck-supple without lymphadenopathy or thyromegaly; no carotid bruits  Heart- regular rate and rhythm, normal s1 and s2 without murmur or gallop;  no edema  Lungs-clear to auscultation  Abdomen-soft, positive bowel sounds, without masses, HSmegaly or pain     Assessment/Plan   Problem List Items Addressed This Visit       Hyperlipidemia - Primary    Relevant Orders    TSH    Lipid panel    OAB (overactive bladder)    Relevant Orders    POCT UA (nonautomated) manually resulted    Osteoporosis    Relevant Medications    alendronate (Fosamax) 70 mg tablet    Other Relevant Orders    Comprehensive metabolic panel    Vitamin D 25-Hydroxy,Total (for eval of Vitamin D levels)    Right knee pain    Status post left knee replacement    Ulcerative colitis (CMS/HCC)    Relevant Orders    Vitamin B12    Urinary frequency     Please provide us a copy of your Living Will and/or the Durable Power of  for Healthcare for your file.     Follow up fasting (no alcohol for 48 hours and just water for 14 hours) in six months for your next routine appointment.  In general, take any medications on schedule (except for types of Insulin).

## 2023-12-27 NOTE — PATIENT INSTRUCTIONS
You are eligible for the COVID booster.    Please provide us a copy of your Living Will and/or the Durable Power of  for Healthcare for your file.     Follow up fasting (no alcohol for 48 hours and just water for 14 hours) in six months for your next routine appointment.  In general, take any medications on schedule (except for types of Insulin).

## 2023-12-28 ENCOUNTER — LAB (OUTPATIENT)
Dept: LAB | Facility: LAB | Age: 73
End: 2023-12-28
Payer: MEDICARE

## 2023-12-28 ENCOUNTER — OFFICE VISIT (OUTPATIENT)
Dept: PRIMARY CARE | Facility: CLINIC | Age: 73
End: 2023-12-28
Payer: MEDICARE

## 2023-12-28 VITALS — DIASTOLIC BLOOD PRESSURE: 84 MMHG | WEIGHT: 136 LBS | BODY MASS INDEX: 24.87 KG/M2 | SYSTOLIC BLOOD PRESSURE: 127 MMHG

## 2023-12-28 DIAGNOSIS — M81.0 OSTEOPOROSIS, UNSPECIFIED OSTEOPOROSIS TYPE, UNSPECIFIED PATHOLOGICAL FRACTURE PRESENCE: ICD-10-CM

## 2023-12-28 DIAGNOSIS — E78.5 HYPERLIPIDEMIA, UNSPECIFIED HYPERLIPIDEMIA TYPE: Primary | ICD-10-CM

## 2023-12-28 DIAGNOSIS — R35.0 URINARY FREQUENCY: ICD-10-CM

## 2023-12-28 DIAGNOSIS — M25.561 CHRONIC PAIN OF RIGHT KNEE: ICD-10-CM

## 2023-12-28 DIAGNOSIS — E78.5 HYPERLIPIDEMIA, UNSPECIFIED HYPERLIPIDEMIA TYPE: ICD-10-CM

## 2023-12-28 DIAGNOSIS — R35.0 URINARY FREQUENCY: Primary | ICD-10-CM

## 2023-12-28 DIAGNOSIS — Z96.652 STATUS POST LEFT KNEE REPLACEMENT: ICD-10-CM

## 2023-12-28 DIAGNOSIS — K51.919 ULCERATIVE COLITIS WITH COMPLICATION, UNSPECIFIED LOCATION (MULTI): ICD-10-CM

## 2023-12-28 DIAGNOSIS — N32.81 OAB (OVERACTIVE BLADDER): ICD-10-CM

## 2023-12-28 DIAGNOSIS — G89.29 CHRONIC PAIN OF RIGHT KNEE: ICD-10-CM

## 2023-12-28 LAB
25(OH)D3 SERPL-MCNC: 44 NG/ML (ref 30–100)
ALBUMIN SERPL BCP-MCNC: 4.2 G/DL (ref 3.4–5)
ALP SERPL-CCNC: 50 U/L (ref 33–136)
ALT SERPL W P-5'-P-CCNC: 20 U/L (ref 7–45)
ANION GAP SERPL CALC-SCNC: 9 MMOL/L (ref 10–20)
AST SERPL W P-5'-P-CCNC: 22 U/L (ref 9–39)
BILIRUB SERPL-MCNC: 0.8 MG/DL (ref 0–1.2)
BUN SERPL-MCNC: 13 MG/DL (ref 6–23)
CALCIUM SERPL-MCNC: 9.3 MG/DL (ref 8.6–10.3)
CHLORIDE SERPL-SCNC: 104 MMOL/L (ref 98–107)
CHOLEST SERPL-MCNC: 158 MG/DL (ref 0–199)
CHOLESTEROL/HDL RATIO: 2.2
CO2 SERPL-SCNC: 29 MMOL/L (ref 21–32)
CREAT SERPL-MCNC: 0.65 MG/DL (ref 0.5–1.05)
GFR SERPL CREATININE-BSD FRML MDRD: >90 ML/MIN/1.73M*2
GLUCOSE SERPL-MCNC: 93 MG/DL (ref 74–99)
HDLC SERPL-MCNC: 71.5 MG/DL
LDLC SERPL CALC-MCNC: 69 MG/DL
NON HDL CHOLESTEROL: 87 MG/DL (ref 0–149)
POC APPEARANCE, URINE: CLEAR
POC BILIRUBIN, URINE: NEGATIVE
POC BLOOD, URINE: NEGATIVE
POC COLOR, URINE: YELLOW
POC GLUCOSE, URINE: NEGATIVE MG/DL
POC KETONES, URINE: NEGATIVE MG/DL
POC LEUKOCYTES, URINE: ABNORMAL
POC NITRITE,URINE: NEGATIVE
POC PH, URINE: 6.5 PH
POC PROTEIN, URINE: NEGATIVE MG/DL
POC SPECIFIC GRAVITY, URINE: 1.02
POC UROBILINOGEN, URINE: 0.2 EU/DL
POTASSIUM SERPL-SCNC: 4 MMOL/L (ref 3.5–5.3)
PROT SERPL-MCNC: 7.4 G/DL (ref 6.4–8.2)
SODIUM SERPL-SCNC: 138 MMOL/L (ref 136–145)
TRIGL SERPL-MCNC: 90 MG/DL (ref 0–149)
TSH SERPL-ACNC: 1.51 MIU/L (ref 0.44–3.98)
VIT B12 SERPL-MCNC: 175 PG/ML (ref 211–911)
VLDL: 18 MG/DL (ref 0–40)

## 2023-12-28 PROCEDURE — 1123F ACP DISCUSS/DSCN MKR DOCD: CPT | Performed by: FAMILY MEDICINE

## 2023-12-28 PROCEDURE — 1170F FXNL STATUS ASSESSED: CPT | Performed by: FAMILY MEDICINE

## 2023-12-28 PROCEDURE — 80053 COMPREHEN METABOLIC PANEL: CPT

## 2023-12-28 PROCEDURE — 82306 VITAMIN D 25 HYDROXY: CPT

## 2023-12-28 PROCEDURE — 36415 COLL VENOUS BLD VENIPUNCTURE: CPT

## 2023-12-28 PROCEDURE — 1160F RVW MEDS BY RX/DR IN RCRD: CPT | Performed by: FAMILY MEDICINE

## 2023-12-28 PROCEDURE — 84443 ASSAY THYROID STIM HORMONE: CPT

## 2023-12-28 PROCEDURE — 82607 VITAMIN B-12: CPT

## 2023-12-28 PROCEDURE — 99214 OFFICE O/P EST MOD 30 MIN: CPT | Performed by: FAMILY MEDICINE

## 2023-12-28 PROCEDURE — G0439 PPPS, SUBSEQ VISIT: HCPCS | Performed by: FAMILY MEDICINE

## 2023-12-28 PROCEDURE — 1036F TOBACCO NON-USER: CPT | Performed by: FAMILY MEDICINE

## 2023-12-28 PROCEDURE — 80061 LIPID PANEL: CPT

## 2023-12-28 PROCEDURE — 1159F MED LIST DOCD IN RCRD: CPT | Performed by: FAMILY MEDICINE

## 2023-12-28 PROCEDURE — 81002 URINALYSIS NONAUTO W/O SCOPE: CPT | Performed by: FAMILY MEDICINE

## 2023-12-28 RX ORDER — ALENDRONATE SODIUM 70 MG/1
70 TABLET ORAL
Qty: 12 TABLET | Refills: 2 | Status: SHIPPED | OUTPATIENT
Start: 2023-12-28 | End: 2024-09-23

## 2023-12-28 RX ORDER — ALENDRONATE SODIUM 70 MG/1
70 TABLET ORAL
Qty: 12 TABLET | Refills: 0 | Status: SHIPPED | OUTPATIENT
Start: 2023-12-28 | End: 2023-12-28 | Stop reason: SDUPTHER

## 2023-12-28 ASSESSMENT — ENCOUNTER SYMPTOMS
DEPRESSION: 0
LOSS OF SENSATION IN FEET: 0
OCCASIONAL FEELINGS OF UNSTEADINESS: 0

## 2023-12-28 ASSESSMENT — PATIENT HEALTH QUESTIONNAIRE - PHQ9
2. FEELING DOWN, DEPRESSED OR HOPELESS: NOT AT ALL
SUM OF ALL RESPONSES TO PHQ9 QUESTIONS 1 AND 2: 0
1. LITTLE INTEREST OR PLEASURE IN DOING THINGS: NOT AT ALL
1. LITTLE INTEREST OR PLEASURE IN DOING THINGS: NOT AT ALL
2. FEELING DOWN, DEPRESSED OR HOPELESS: NOT AT ALL
SUM OF ALL RESPONSES TO PHQ9 QUESTIONS 1 & 2: 0

## 2023-12-28 ASSESSMENT — ACTIVITIES OF DAILY LIVING (ADL)
DRESSING: INDEPENDENT
MANAGING_FINANCES: INDEPENDENT
TAKING_MEDICATION: INDEPENDENT
GROCERY_SHOPPING: INDEPENDENT
BATHING: INDEPENDENT
DOING_HOUSEWORK: INDEPENDENT

## 2023-12-29 ENCOUNTER — LAB (OUTPATIENT)
Dept: LAB | Facility: LAB | Age: 73
End: 2023-12-29
Payer: MEDICARE

## 2023-12-29 DIAGNOSIS — R35.0 URINARY FREQUENCY: ICD-10-CM

## 2023-12-29 DIAGNOSIS — E53.8 VITAMIN B12 DEFICIENCY: Primary | ICD-10-CM

## 2023-12-29 PROCEDURE — 87086 URINE CULTURE/COLONY COUNT: CPT

## 2023-12-29 RX ORDER — PNV NO.95/FERROUS FUM/FOLIC AC 28MG-0.8MG
100 TABLET ORAL DAILY
Qty: 90 TABLET | Refills: 1 | Status: SHIPPED | OUTPATIENT
Start: 2023-12-29 | End: 2024-06-26

## 2023-12-30 LAB — BACTERIA UR CULT: NORMAL

## 2024-01-15 LAB
CYTOLOGY CMNT CVX/VAG CYTO-IMP: NORMAL
HPV HR 12 DNA GENITAL QL NAA+PROBE: NEGATIVE
HPV HR GENOTYPES PNL CVX NAA+PROBE: NEGATIVE
HPV16 DNA SPEC QL NAA+PROBE: NEGATIVE
HPV18 DNA SPEC QL NAA+PROBE: NEGATIVE
LAB AP HPV GENOTYPE QUESTION: YES
LAB AP HPV HR: NORMAL
LAB AP TREATMENT HISTORY: NORMAL
LABORATORY COMMENT REPORT: NORMAL
MENSTRUAL HX REPORTED: NORMAL
PATH REPORT.TOTAL CANCER: NORMAL

## 2024-01-16 ENCOUNTER — TELEPHONE (OUTPATIENT)
Dept: OBSTETRICS AND GYNECOLOGY | Facility: CLINIC | Age: 74
End: 2024-01-16
Payer: MEDICARE

## 2024-01-16 NOTE — TELEPHONE ENCOUNTER
----- Message from Anju Moreland MD sent at 1/15/2024  8:30 PM EST -----  Regarding: Call the patient and let her know the results, please.  I would like you to schedule a colposcopy.  ----- Message -----  From: Lab, Background User  Sent: 1/15/2024   4:37 PM EST  To: Anju Moreland MD    1/16/24 0910 Pt is aware of Pap results and understands need for colpo. She was transferred to the  to schedule.

## 2024-03-13 ENCOUNTER — APPOINTMENT (OUTPATIENT)
Dept: OBSTETRICS AND GYNECOLOGY | Facility: CLINIC | Age: 74
End: 2024-03-13
Payer: MEDICARE

## 2024-04-10 ENCOUNTER — OFFICE VISIT (OUTPATIENT)
Dept: PRIMARY CARE | Facility: CLINIC | Age: 74
End: 2024-04-10
Payer: MEDICARE

## 2024-04-10 VITALS
OXYGEN SATURATION: 99 % | BODY MASS INDEX: 25.95 KG/M2 | HEART RATE: 76 BPM | SYSTOLIC BLOOD PRESSURE: 132 MMHG | DIASTOLIC BLOOD PRESSURE: 76 MMHG | WEIGHT: 141 LBS | HEIGHT: 62 IN

## 2024-04-10 DIAGNOSIS — R10.2 PELVIC PAIN: Primary | ICD-10-CM

## 2024-04-10 DIAGNOSIS — R10.84 GENERALIZED ABDOMINAL PAIN: ICD-10-CM

## 2024-04-10 DIAGNOSIS — K51.919 ULCERATIVE COLITIS WITH COMPLICATION, UNSPECIFIED LOCATION (MULTI): ICD-10-CM

## 2024-04-10 PROBLEM — M20.30 ACQUIRED HALLUX MALLEUS: Status: ACTIVE | Noted: 2023-06-02

## 2024-04-10 PROBLEM — M25.561 RIGHT KNEE PAIN: Status: RESOLVED | Noted: 2023-06-02 | Resolved: 2024-04-10

## 2024-04-10 PROBLEM — Z96.652 STATUS POST LEFT KNEE REPLACEMENT: Status: RESOLVED | Noted: 2023-06-02 | Resolved: 2024-04-10

## 2024-04-10 PROBLEM — D50.9 IRON DEFICIENCY ANEMIA: Status: ACTIVE | Noted: 2024-04-10

## 2024-04-10 PROBLEM — L82.1 SEBORRHEIC KERATOSIS: Status: ACTIVE | Noted: 2024-04-10

## 2024-04-10 PROBLEM — M93.90 OSTEOCHONDROPATHY: Status: ACTIVE | Noted: 2024-04-10

## 2024-04-10 PROBLEM — L81.4 LENTIGINOSIS: Status: ACTIVE | Noted: 2024-04-10

## 2024-04-10 PROBLEM — N39.41 URGE INCONTINENCE OF URINE: Status: ACTIVE | Noted: 2023-06-02

## 2024-04-10 PROBLEM — L57.0 ACTINIC KERATOSIS: Status: ACTIVE | Noted: 2024-04-10

## 2024-04-10 PROBLEM — D48.5 NEOPLASM OF UNCERTAIN BEHAVIOR OF SKIN: Status: ACTIVE | Noted: 2024-04-10

## 2024-04-10 PROBLEM — I47.10 SUPRAVENTRICULAR TACHYCARDIA (CMS-HCC): Status: ACTIVE | Noted: 2024-04-10

## 2024-04-10 PROBLEM — D18.01 HEMANGIOMA OF SKIN: Status: ACTIVE | Noted: 2024-04-10

## 2024-04-10 PROBLEM — L57.8 PHOTOAGED SKIN: Status: ACTIVE | Noted: 2024-04-10

## 2024-04-10 LAB
POC APPEARANCE, URINE: CLEAR
POC BILIRUBIN, URINE: NEGATIVE
POC BLOOD, URINE: NEGATIVE
POC COLOR, URINE: COLORLESS
POC GLUCOSE, URINE: NEGATIVE MG/DL
POC KETONES, URINE: NEGATIVE MG/DL
POC LEUKOCYTES, URINE: NEGATIVE
POC NITRITE,URINE: NEGATIVE
POC PH, URINE: 6 PH
POC PROTEIN, URINE: NEGATIVE MG/DL
POC SPECIFIC GRAVITY, URINE: 1.01
POC UROBILINOGEN, URINE: 0.2 EU/DL

## 2024-04-10 PROCEDURE — 81003 URINALYSIS AUTO W/O SCOPE: CPT | Performed by: NURSE PRACTITIONER

## 2024-04-10 PROCEDURE — 1160F RVW MEDS BY RX/DR IN RCRD: CPT | Performed by: NURSE PRACTITIONER

## 2024-04-10 PROCEDURE — 99214 OFFICE O/P EST MOD 30 MIN: CPT | Performed by: NURSE PRACTITIONER

## 2024-04-10 PROCEDURE — 1036F TOBACCO NON-USER: CPT | Performed by: NURSE PRACTITIONER

## 2024-04-10 PROCEDURE — 1159F MED LIST DOCD IN RCRD: CPT | Performed by: NURSE PRACTITIONER

## 2024-04-10 RX ORDER — DIPHENOXYLATE HYDROCHLORIDE AND ATROPINE SULFATE 2.5; .025 MG/5ML; MG/5ML
5 SOLUTION ORAL 4 TIMES DAILY PRN
COMMUNITY

## 2024-04-10 RX ORDER — LANOLIN ALCOHOL/MO/W.PET/CERES
1 CREAM (GRAM) TOPICAL DAILY
COMMUNITY
Start: 2024-03-09

## 2024-04-10 RX ORDER — EPINEPHRINE 0.22MG
AEROSOL WITH ADAPTER (ML) INHALATION
COMMUNITY
Start: 2017-09-05

## 2024-04-10 ASSESSMENT — PATIENT HEALTH QUESTIONNAIRE - PHQ9
SUM OF ALL RESPONSES TO PHQ9 QUESTIONS 1 & 2: 0
1. LITTLE INTEREST OR PLEASURE IN DOING THINGS: NOT AT ALL
2. FEELING DOWN, DEPRESSED OR HOPELESS: NOT AT ALL

## 2024-04-10 NOTE — PROGRESS NOTES
Subjective   Patient ID: Rosalba Lewis is a 73 y.o. female who presents for abdominal pain for a few weeks.    Dr Prieto Patricia pt    HPI     Past 3 weeks c/o lower abd pain that radiates to periumbilical region. Also c/o left lower pelvic discomfort.   Pain level varying.   No changes with or without intake of food.   No changes prior to or after having a BM.   BM remains brown and formed. No blood.   No vaginal discharge  No fever or nausea    H/O IBS with diarrhea  States that she has been in control with the use of Mesalamine.   Evaluated by GI Dr Reji Drake in the past     Gyn appt scheduled 5/2/24    Past Medical History:   Diagnosis Date    Acute upper respiratory infection, unspecified 03/14/2019    Acute URI    Body mass index (BMI) 27.0-27.9, adult 06/24/2021    BMI 27.0-27.9,adult    Conjunctival hemorrhage, right eye 06/22/2017    Subconjunctival hemorrhage of right eye    Cough, unspecified 12/15/2014    Cough    Dislocation of other parts of thorax, sequela 02/10/2020     rib, sequela    Elevated blood-pressure reading, without diagnosis of hypertension 06/14/2017    Blood pressure elevated without history of HTN    Encounter for other preprocedural examination 11/18/2021    Preoperative evaluation to rule out surgical contraindication    Encounter for other preprocedural examination 11/29/2021    Preoperative clearance    Encounter for other preprocedural examination 05/01/2018    Preoperative clearance    Erythematous condition, unspecified 08/24/2018    Erythema of toe    Noninfective gastroenteritis and colitis, unspecified 12/15/2014    Colitis    Other conditions influencing health status 12/11/2013    History of cough    Other specified health status 09/10/2015    History of dog bite    Other specified postprocedural states     History of Papanicolaou smear    Other specified soft tissue disorders 08/24/2018    Swelling of toe of right foot    Pain in left toe(s) 09/05/2017    Pain of toe  "of left foot    Pain in right toe(s) 08/24/2018    Pain of right great toe    Pain in unspecified toe(s) 08/24/2018    Pain in toe    Personal history of diseases of the blood and blood-forming organs and certain disorders involving the immune mechanism 05/06/2019    History of iron deficiency anemia    Personal history of other diseases of the nervous system and sense organs     History of blurred vision    Personal history of other diseases of the respiratory system 12/15/2014    History of sinusitis    Personal history of other diseases of the respiratory system     History of acute pharyngitis    Personal history of other endocrine, nutritional and metabolic disease 09/09/2015    History of obesity    Personal history of other medical treatment 07/27/2017    History of screening mammography    Personal history of other mental and behavioral disorders 12/12/2022    History of depression    Personal history of other specified conditions 08/02/2021    History of fatigue    Personal history of other specified conditions 10/14/2015    History of diarrhea    Personal history of other specified conditions     History of dysuria    Personal history of pneumonia (recurrent) 02/01/2016    History of pneumonia    Pleurodynia 07/10/2018    Rib pain on left side    Tinnitus, bilateral 09/05/2017    Tinnitus, bilateral    Unspecified asthma, uncomplicated 01/15/2019    AB (asthmatic bronchitis)    Unspecified asthma, uncomplicated 05/23/2022    AB (asthmatic bronchitis)    Urinary tract infection, site not specified 01/28/2017    Acute lower UTI     Past Surgical History:   Procedure Laterality Date    CERVICAL BIOPSY  W/ LOOP ELECTRODE EXCISION  02/04/2016    Cervical Loop Electrosurgical Excision (LEEP)    COLPOSCOPY  2024    TOTAL KNEE ARTHROPLASTY Left 2018       Review of Systems    Objective   /76   Pulse 76   Ht 1.575 m (5' 2\")   Wt 64 kg (141 lb)   SpO2 99%   BMI 25.79 kg/m²     Physical Exam    Alert and " oriented x 3  Appears healthy  Does not appear/sound dyspneic with conversation  Speech clear.  Hearing adequate.  Psych: Normal affect. Good judgment and insight.   Abd with bowel sounds x4. Mild tenderness noted t/o with palpation    Assessment/Plan     1. Generalized abdominal pain/pelvic pain:   Avoid milk products, caffeine, fatty foods, high-fiber foods, and sweets. All of these may make diarrhea worse. You can eat the following:° bananas° plain rice° boiled potatoes° toast° applesauce° saltine crackers° clear soup (chicken or beef broth)° cooked carrots° baked chicken without skin or fat.     - CBC; Future  - Comprehensive metabolic panel; Future  - Amylase; Future  - US abdomen; Future  - US pelvis; Future  - POCT UA Automated manually resulted  Follow up with gyn 5/2/24    2. Ulcerative colitis with complication, unspecified location (CMS/HCC)  Follow-up with specialist per their discretion/direction.        Follow up: if pain increases or blood noted in stool, please contact med services    Medications refills will be completed as discussed.     Any labs or testing that is ordered will be reviewed and the results will be in your chart .   You can review these via  [employer].     Prescriptions will not be filled unless you are compliant with your follow-up appointments or have a follow-up appointment scheduled as per the instruction of your provider. Refills for medications should be requested at the time of your office visit.     Please allow one week for refill requests to be completed.     Contact office with any questions or concerns.   Preferred communication is via  [employer]      Call  Services: [phone] to assist with scheduling.      Anamaria JENNINGS-[facility]  [address], [address]  [place] [id number]  Phone: [phone]    **Charting was completed using voice recognition technology and may include unintended errors**

## 2024-04-16 ENCOUNTER — APPOINTMENT (OUTPATIENT)
Dept: PRIMARY CARE | Facility: CLINIC | Age: 74
End: 2024-04-16
Payer: MEDICARE

## 2024-04-18 ENCOUNTER — HOSPITAL ENCOUNTER (OUTPATIENT)
Dept: RADIOLOGY | Facility: CLINIC | Age: 74
Discharge: HOME | End: 2024-04-18
Payer: MEDICARE

## 2024-04-18 ENCOUNTER — APPOINTMENT (OUTPATIENT)
Dept: RADIOLOGY | Facility: CLINIC | Age: 74
End: 2024-04-18
Payer: MEDICARE

## 2024-04-18 DIAGNOSIS — R10.84 GENERALIZED ABDOMINAL PAIN: ICD-10-CM

## 2024-04-18 DIAGNOSIS — R10.2 PELVIC PAIN: ICD-10-CM

## 2024-04-18 PROCEDURE — 76856 US EXAM PELVIC COMPLETE: CPT

## 2024-04-18 PROCEDURE — 76700 US EXAM ABDOM COMPLETE: CPT

## 2024-04-18 PROCEDURE — 76856 US EXAM PELVIC COMPLETE: CPT | Performed by: RADIOLOGY

## 2024-04-18 PROCEDURE — 76700 US EXAM ABDOM COMPLETE: CPT | Performed by: RADIOLOGY

## 2024-04-18 PROCEDURE — 76830 TRANSVAGINAL US NON-OB: CPT | Performed by: RADIOLOGY

## 2024-04-19 DIAGNOSIS — K76.89 LIVER CYST: Primary | ICD-10-CM

## 2024-04-22 ENCOUNTER — TELEPHONE (OUTPATIENT)
Dept: PRIMARY CARE | Facility: CLINIC | Age: 74
End: 2024-04-22
Payer: MEDICARE

## 2024-04-22 NOTE — TELEPHONE ENCOUNTER
"Megha saw you on 4/10/24 for abdominal pain. U/S showed \"Complex cysts of the liver with intrinsic septations\" (labs ordered but pt didn't get them done - called her and reminded her - she will get them done today). Appt with GI 5/9/24. She would like to know what she can take for pain in the meantime. She had a hard time sleeping last night because of it.  "

## 2024-04-23 ENCOUNTER — LAB (OUTPATIENT)
Dept: LAB | Facility: LAB | Age: 74
End: 2024-04-23
Payer: MEDICARE

## 2024-04-23 DIAGNOSIS — R10.84 GENERALIZED ABDOMINAL PAIN: ICD-10-CM

## 2024-04-23 DIAGNOSIS — R10.2 PELVIC PAIN: ICD-10-CM

## 2024-04-23 LAB
ALBUMIN SERPL BCP-MCNC: 4.3 G/DL (ref 3.4–5)
ALP SERPL-CCNC: 54 U/L (ref 33–136)
ALT SERPL W P-5'-P-CCNC: 15 U/L (ref 7–45)
AMYLASE SERPL-CCNC: 41 U/L (ref 29–103)
ANION GAP SERPL CALC-SCNC: 9 MMOL/L (ref 10–20)
AST SERPL W P-5'-P-CCNC: 21 U/L (ref 9–39)
BILIRUB SERPL-MCNC: 0.6 MG/DL (ref 0–1.2)
BUN SERPL-MCNC: 8 MG/DL (ref 6–23)
CALCIUM SERPL-MCNC: 9 MG/DL (ref 8.6–10.3)
CHLORIDE SERPL-SCNC: 105 MMOL/L (ref 98–107)
CO2 SERPL-SCNC: 28 MMOL/L (ref 21–32)
CREAT SERPL-MCNC: 0.6 MG/DL (ref 0.5–1.05)
EGFRCR SERPLBLD CKD-EPI 2021: >90 ML/MIN/1.73M*2
ERYTHROCYTE [DISTWIDTH] IN BLOOD BY AUTOMATED COUNT: 12.7 % (ref 11.5–14.5)
GLUCOSE SERPL-MCNC: 97 MG/DL (ref 74–99)
HCT VFR BLD AUTO: 38 % (ref 36–46)
HGB BLD-MCNC: 13 G/DL (ref 12–16)
MCH RBC QN AUTO: 32 PG (ref 26–34)
MCHC RBC AUTO-ENTMCNC: 34.2 G/DL (ref 32–36)
MCV RBC AUTO: 94 FL (ref 80–100)
NRBC BLD-RTO: 0 /100 WBCS (ref 0–0)
PLATELET # BLD AUTO: 239 X10*3/UL (ref 150–450)
POTASSIUM SERPL-SCNC: 3.7 MMOL/L (ref 3.5–5.3)
PROT SERPL-MCNC: 7.7 G/DL (ref 6.4–8.2)
RBC # BLD AUTO: 4.06 X10*6/UL (ref 4–5.2)
SODIUM SERPL-SCNC: 138 MMOL/L (ref 136–145)
WBC # BLD AUTO: 6.9 X10*3/UL (ref 4.4–11.3)

## 2024-04-23 PROCEDURE — 82150 ASSAY OF AMYLASE: CPT

## 2024-04-23 PROCEDURE — 80053 COMPREHEN METABOLIC PANEL: CPT

## 2024-04-23 PROCEDURE — 36415 COLL VENOUS BLD VENIPUNCTURE: CPT

## 2024-04-23 PROCEDURE — 85027 COMPLETE CBC AUTOMATED: CPT

## 2024-05-02 ENCOUNTER — PROCEDURE VISIT (OUTPATIENT)
Dept: OBSTETRICS AND GYNECOLOGY | Facility: CLINIC | Age: 74
End: 2024-05-02
Payer: MEDICARE

## 2024-05-02 VITALS
WEIGHT: 137 LBS | SYSTOLIC BLOOD PRESSURE: 110 MMHG | BODY MASS INDEX: 25.21 KG/M2 | DIASTOLIC BLOOD PRESSURE: 64 MMHG | HEIGHT: 62 IN

## 2024-05-02 DIAGNOSIS — R87.610 ATYPICAL SQUAMOUS CELL CHANGES OF UNDETERMINED SIGNIFICANCE (ASCUS) ON CERVICAL CYTOLOGY WITH NEGATIVE HIGH RISK HUMAN PAPILLOMA VIRUS (HPV) TEST RESULT: ICD-10-CM

## 2024-05-02 PROCEDURE — 88342 IMHCHEM/IMCYTCHM 1ST ANTB: CPT | Performed by: PATHOLOGY

## 2024-05-02 PROCEDURE — 88305 TISSUE EXAM BY PATHOLOGIST: CPT

## 2024-05-02 PROCEDURE — 88305 TISSUE EXAM BY PATHOLOGIST: CPT | Performed by: PATHOLOGY

## 2024-05-02 PROCEDURE — 88341 IMHCHEM/IMCYTCHM EA ADD ANTB: CPT

## 2024-05-02 PROCEDURE — 88341 IMHCHEM/IMCYTCHM EA ADD ANTB: CPT | Performed by: PATHOLOGY

## 2024-05-02 PROCEDURE — 88342 IMHCHEM/IMCYTCHM 1ST ANTB: CPT

## 2024-05-02 PROCEDURE — 57454 BX/CURETT OF CERVIX W/SCOPE: CPT | Performed by: OBSTETRICS & GYNECOLOGY

## 2024-05-02 NOTE — PROGRESS NOTES
Patient ID: Rosalba Lewis is a 73 y.o. female.    Colposcopy    Date/Time: 5/2/2024 10:31 AM    Performed by: Anju Moreland MD  Authorized by: Anju Moreland MD    Consent:     Patient questions answered: yes      Risks and benefits of the procedure and its alternatives discussed: yes      Procedural risks discussed:  Repeat procedure    Consent obtained:  Written    Consent given by:  Patient  Pre-procedure:     Speculum was placed in the vagina: yes      Prep solution(s): acetic acid and Lugol's iodine    Procedure:     Colposcopy with: colposcopy only and endocervical curettage      Cervix visibility: fully visualized      SCJ visibility: not fully visualized    Post-procedure:     Estimated blood loss (mL):  0  Comments:      No visible lesions

## 2024-05-09 ENCOUNTER — OFFICE VISIT (OUTPATIENT)
Dept: GASTROENTEROLOGY | Facility: CLINIC | Age: 74
End: 2024-05-09
Payer: MEDICARE

## 2024-05-09 VITALS
BODY MASS INDEX: 24.87 KG/M2 | HEART RATE: 74 BPM | SYSTOLIC BLOOD PRESSURE: 118 MMHG | DIASTOLIC BLOOD PRESSURE: 74 MMHG | TEMPERATURE: 97.4 F | WEIGHT: 136 LBS

## 2024-05-09 DIAGNOSIS — K76.89 LIVER CYST: ICD-10-CM

## 2024-05-09 PROCEDURE — 1126F AMNT PAIN NOTED NONE PRSNT: CPT | Performed by: NURSE PRACTITIONER

## 2024-05-09 PROCEDURE — 1159F MED LIST DOCD IN RCRD: CPT | Performed by: NURSE PRACTITIONER

## 2024-05-09 PROCEDURE — 1036F TOBACCO NON-USER: CPT | Performed by: NURSE PRACTITIONER

## 2024-05-09 PROCEDURE — 99214 OFFICE O/P EST MOD 30 MIN: CPT | Performed by: NURSE PRACTITIONER

## 2024-05-09 PROCEDURE — 99204 OFFICE O/P NEW MOD 45 MIN: CPT | Performed by: NURSE PRACTITIONER

## 2024-05-09 PROCEDURE — 1160F RVW MEDS BY RX/DR IN RCRD: CPT | Performed by: NURSE PRACTITIONER

## 2024-05-09 ASSESSMENT — ENCOUNTER SYMPTOMS
SHORTNESS OF BREATH: 0
NUMBNESS: 0
JOINT SWELLING: 0
HEADACHES: 0
AGITATION: 0
CONSTIPATION: 0
COLOR CHANGE: 0
TREMORS: 0
WEAKNESS: 0
DEPRESSION: 0
DYSURIA: 0
UNEXPECTED WEIGHT CHANGE: 0
DIFFICULTY URINATING: 0
DIZZINESS: 0
BRUISES/BLEEDS EASILY: 0
COUGH: 0
FEVER: 0
HEMATURIA: 0
DIARRHEA: 0
BLOOD IN STOOL: 0
ABDOMINAL PAIN: 0
NAUSEA: 0
APPETITE CHANGE: 0
VOICE CHANGE: 0
CONFUSION: 0
LIGHT-HEADEDNESS: 0
ABDOMINAL DISTENTION: 0
VOMITING: 0
FREQUENCY: 0
CHILLS: 0
WHEEZING: 0
SLEEP DISTURBANCE: 0
PALPITATIONS: 0
TROUBLE SWALLOWING: 0

## 2024-05-09 ASSESSMENT — PAIN SCALES - GENERAL: PAINLEVEL: 0-NO PAIN

## 2024-05-09 NOTE — ASSESSMENT & PLAN NOTE
73 year old with imaging findings of hepatic cysts on US.  She had a CT of abdomen in 2020 showing simple hepatic cysts with larges ~ 2.1cm in left hepatic lobe.    US of abdomen recently shows the same with 2 other cysts which have the appearance to be complex with septations.   In order to better determine characteristics of hepatic cysts, MRI is recommended.   This will help determine if further work is needed or if monitoring is appropriate.  FU once MRI completed.

## 2024-05-09 NOTE — PROGRESS NOTES
Subjective   Patient ID: Rosalba Lewis is a 73 y.o. female who presents for evaluation for liver cysts.    HPI  73 year old with history of ulcerative colitis (on mesalamine) and osteoporosis referred for imaging findings of hepatic cysts.   She saw her PCP and GYN for lower abdominal discomfort that was severe at times.  She had US of abdomen which showed 3 complex hepatic cysts with concerns for septation and nodularity.     She had a CT scan in 2020 which showed 1 simple cyst ~ 2.1cm.    Pt does not endorse RUQ or epigastric pain.   Denies jaundice, icterus, edema, bleeding, confusion, itching or joint pain.  She exercises regularly and follows a healthy diet.    Pt reports having quit drinking 4 years ago and at times was drinking heavy.  Liver tests completely normal.     LABS:   Lab Results   Component Value Date    ALBUMIN 4.3 04/23/2024    BILITOT 0.6 04/23/2024    ALKPHOS 54 04/23/2024    ALT 15 04/23/2024    AST 21 04/23/2024    PROT 7.7 04/23/2024      Lab Results   Component Value Date    WBC 6.9 04/23/2024    HGB 13.0 04/23/2024    HCT 38.0 04/23/2024    MCV 94 04/23/2024     04/23/2024       === 04/18/24 ===  US of Abdomen    IMPRESSION:  Complex cysts of the liver with intrinsic septations and possible  nodularity. MRI may be considered for further characterization.    Review of Systems   Constitutional:  Negative for appetite change, chills, fever and unexpected weight change.   HENT:  Negative for mouth sores, nosebleeds, trouble swallowing and voice change.    Eyes:  Negative for visual disturbance.   Respiratory:  Negative for cough, shortness of breath and wheezing.    Cardiovascular:  Negative for chest pain, palpitations and leg swelling.   Gastrointestinal:  Negative for abdominal distention, abdominal pain, blood in stool, constipation, diarrhea, nausea and vomiting.   Genitourinary:  Negative for decreased urine volume, difficulty urinating, dysuria, frequency, hematuria and urgency.    Musculoskeletal:  Negative for gait problem and joint swelling.   Skin:  Negative for color change, pallor and rash.   Neurological:  Negative for dizziness, tremors, weakness, light-headedness, numbness and headaches.   Hematological:  Does not bruise/bleed easily.   Psychiatric/Behavioral:  Negative for agitation, behavioral problems, confusion and sleep disturbance.        Objective   Physical Exam  Constitutional:       General: She is awake.      Appearance: Normal appearance. She is well-developed.   HENT:      Head: Normocephalic and atraumatic.      Right Ear: Hearing normal.      Left Ear: Hearing normal.      Nose: Nose normal.      Mouth/Throat:      Lips: Pink.      Mouth: Mucous membranes are moist.   Eyes:      General: Lids are normal.      Extraocular Movements: Extraocular movements intact.      Conjunctiva/sclera: Conjunctivae normal.      Pupils: Pupils are equal, round, and reactive to light.   Cardiovascular:      Rate and Rhythm: Normal rate and regular rhythm.      Pulses: Normal pulses.      Heart sounds: Normal heart sounds.   Pulmonary:      Effort: Pulmonary effort is normal.      Breath sounds: Normal breath sounds.   Abdominal:      General: Abdomen is flat. Bowel sounds are normal.      Palpations: Abdomen is soft.   Musculoskeletal:      Cervical back: Normal range of motion and neck supple.   Feet:      Right foot:      Skin integrity: Skin integrity normal.      Left foot:      Skin integrity: Skin integrity normal.   Skin:     General: Skin is warm.   Neurological:      General: No focal deficit present.      Mental Status: She is alert and oriented to person, place, and time.      Cranial Nerves: Cranial nerves 2-12 are intact.      Sensory: Sensation is intact.      Motor: Motor function is intact.      Coordination: Coordination is intact.      Gait: Gait is intact.   Psychiatric:         Attention and Perception: Attention and perception normal.         Mood and Affect: Mood  normal.         Speech: Speech normal.         Behavior: Behavior is cooperative.         Thought Content: Thought content normal.         Cognition and Memory: Cognition normal.         Judgment: Judgment normal.         Assessment/Plan   Problem List Items Addressed This Visit             ICD-10-CM    Liver cyst K76.89     73 year old with imaging findings of hepatic cysts on US.  She had a CT of abdomen in 2020 showing simple hepatic cysts with larges ~ 2.1cm in left hepatic lobe.    US of abdomen recently shows the same with 2 other cysts which have the appearance to be complex with septations.   In order to better determine characteristics of hepatic cysts, MRI is recommended.   This will help determine if further work is needed or if monitoring is appropriate.  FU once MRI completed.          Relevant Orders    MR liver w and wo IV contrast            MICHAELA Callahan-CNP 05/09/24 1:54 PM

## 2024-05-20 LAB
LAB AP ASR DISCLAIMER: NORMAL
LABORATORY COMMENT REPORT: NORMAL
PATH REPORT.COMMENTS IMP SPEC: NORMAL
PATH REPORT.FINAL DX SPEC: NORMAL
PATH REPORT.GROSS SPEC: NORMAL
PATH REPORT.RELEVANT HX SPEC: NORMAL
PATH REPORT.TOTAL CANCER: NORMAL

## 2024-05-23 ENCOUNTER — HOSPITAL ENCOUNTER (OUTPATIENT)
Dept: RADIOLOGY | Facility: HOSPITAL | Age: 74
Discharge: HOME | End: 2024-05-23
Payer: MEDICARE

## 2024-05-23 DIAGNOSIS — K76.89 LIVER CYST: ICD-10-CM

## 2024-05-23 PROCEDURE — 2550000001 HC RX 255 CONTRASTS: Performed by: NURSE PRACTITIONER

## 2024-05-23 PROCEDURE — 74183 MRI ABD W/O CNTR FLWD CNTR: CPT

## 2024-05-23 PROCEDURE — A9575 INJ GADOTERATE MEGLUMI 0.1ML: HCPCS | Performed by: NURSE PRACTITIONER

## 2024-05-23 RX ORDER — GADOTERATE MEGLUMINE 376.9 MG/ML
0.2 INJECTION INTRAVENOUS
Status: COMPLETED | OUTPATIENT
Start: 2024-05-23 | End: 2024-05-23

## 2024-05-23 RX ADMIN — GADOTERATE MEGLUMINE 12 ML: 376.9 INJECTION INTRAVENOUS at 15:46

## 2024-06-14 DIAGNOSIS — E78.5 HYPERLIPIDEMIA, UNSPECIFIED HYPERLIPIDEMIA TYPE: ICD-10-CM

## 2024-06-18 ENCOUNTER — TELEPHONE (OUTPATIENT)
Dept: PRIMARY CARE | Facility: CLINIC | Age: 74
End: 2024-06-18
Payer: MEDICARE

## 2024-06-18 DIAGNOSIS — E78.5 HYPERLIPIDEMIA, UNSPECIFIED HYPERLIPIDEMIA TYPE: ICD-10-CM

## 2024-06-18 RX ORDER — ATORVASTATIN CALCIUM 10 MG/1
10 TABLET, FILM COATED ORAL NIGHTLY
Qty: 90 TABLET | Refills: 1 | Status: SHIPPED | OUTPATIENT
Start: 2024-06-18 | End: 2024-06-19 | Stop reason: SDUPTHER

## 2024-06-18 RX ORDER — ATORVASTATIN CALCIUM 10 MG/1
10 TABLET, FILM COATED ORAL NIGHTLY
Qty: 90 TABLET | Refills: 1 | OUTPATIENT
Start: 2024-06-18

## 2024-06-18 NOTE — TELEPHONE ENCOUNTER
Dr. RUIZ pt and Dr. CAIN is covering     Refill:    Atorvastatin 10mg daily    Pharm: [facility] # [phone]    LR: 11/16/24 qty 90 w/ 1 refill  LV: 04/10/24  NV: 09/26/24

## 2024-06-19 DIAGNOSIS — E78.5 HYPERLIPIDEMIA, UNSPECIFIED HYPERLIPIDEMIA TYPE: ICD-10-CM

## 2024-06-19 RX ORDER — ATORVASTATIN CALCIUM 10 MG/1
10 TABLET, FILM COATED ORAL NIGHTLY
Qty: 90 TABLET | Refills: 0 | Status: SHIPPED | OUTPATIENT
Start: 2024-06-19 | End: 2024-09-17

## 2024-06-29 DIAGNOSIS — E53.8 VITAMIN B12 DEFICIENCY: ICD-10-CM

## 2024-06-29 RX ORDER — PNV NO.95/FERROUS FUM/FOLIC AC 28MG-0.8MG
100 TABLET ORAL DAILY
Qty: 90 TABLET | Refills: 1 | OUTPATIENT
Start: 2024-06-29 | End: 2024-12-26

## 2024-07-02 ENCOUNTER — OFFICE VISIT (OUTPATIENT)
Dept: PRIMARY CARE | Facility: CLINIC | Age: 74
End: 2024-07-02
Payer: MEDICARE

## 2024-07-02 VITALS
SYSTOLIC BLOOD PRESSURE: 152 MMHG | TEMPERATURE: 97.2 F | WEIGHT: 137.79 LBS | BODY MASS INDEX: 25.2 KG/M2 | DIASTOLIC BLOOD PRESSURE: 90 MMHG

## 2024-07-02 DIAGNOSIS — B02.9 HERPES ZOSTER WITHOUT COMPLICATION: Primary | ICD-10-CM

## 2024-07-02 PROCEDURE — 99213 OFFICE O/P EST LOW 20 MIN: CPT | Performed by: FAMILY MEDICINE

## 2024-07-02 PROCEDURE — 1160F RVW MEDS BY RX/DR IN RCRD: CPT | Performed by: FAMILY MEDICINE

## 2024-07-02 PROCEDURE — 1036F TOBACCO NON-USER: CPT | Performed by: FAMILY MEDICINE

## 2024-07-02 PROCEDURE — 1159F MED LIST DOCD IN RCRD: CPT | Performed by: FAMILY MEDICINE

## 2024-07-02 RX ORDER — FAMCICLOVIR 500 MG/1
500 TABLET ORAL 3 TIMES DAILY
Qty: 21 TABLET | Refills: 0 | Status: SHIPPED | OUTPATIENT
Start: 2024-07-02 | End: 2024-07-09

## 2024-07-02 NOTE — PROGRESS NOTES
Subjective   Patient ID: [id number]     Rosalba Lewis is a 74 y.o. female who presents for Rash.    HPI  Travelling to [place] for three weeks and masseuse noted rash on right side of neck yesterday    Review of Systems  ID-no fever  GEN-felt achy for the last five days  Neuro-no headache      Objective     /90 (BP Location: Left arm, Patient Position: Sitting)   Temp 36.2 °C (97.2 °F) (Oral)   Wt 62.5 kg (137 lb 12.6 oz)   BMI 25.20 kg/m²      Physical Exam  Neck-supple without lymphadenopathy or thyromegaly; no carotid bruits  Heart- regular rate and rhythm, normal s1 and s2 without murmur or gallop  Lungs-clear to auscultation  Derm-red grouped macules on nape of neck primarily on right side    Assessment/Plan     Problem List Items Addressed This Visit    None  Visit Diagnoses       Herpes zoster without complication    -  Primary    Relevant Medications    famciclovir (Famvir) 500 mg tablet          Follow up in September for your next routine appointment.    Prieto Patricia MD

## 2024-08-26 PROBLEM — I47.10 SUPRAVENTRICULAR TACHYCARDIA (CMS-HCC): Status: RESOLVED | Noted: 2024-04-10 | Resolved: 2024-08-26

## 2024-08-26 PROBLEM — R10.84 GENERALIZED ABDOMINAL PAIN: Status: RESOLVED | Noted: 2024-04-10 | Resolved: 2024-08-26

## 2024-08-26 PROBLEM — L57.0 ACTINIC KERATOSIS: Status: RESOLVED | Noted: 2024-04-10 | Resolved: 2024-08-26

## 2024-08-26 PROBLEM — D48.5 NEOPLASM OF UNCERTAIN BEHAVIOR OF SKIN: Status: RESOLVED | Noted: 2024-04-10 | Resolved: 2024-08-26

## 2024-08-26 NOTE — PATIENT INSTRUCTIONS
Please provide us a copy of your Living Will and/or the Durable Power of  for Healthcare for your file.     Considering your health conditions, I recommend that you get the RSV vaccine at your local pharmacy.    Please get your mammogram.    Follow up fasting (no alcohol for 48 hours and just water for 14 hours) in six months for your next routine appointment.  In general, take any medications on schedule (except for types of Insulin)

## 2024-08-26 NOTE — PROGRESS NOTES
Subjective   Reason for Visit: Rosalba Lewis is an 74 y.o. female here for a Medicare Wellness visit.     Past Medical, Surgical, and Family History reviewed and updated in chart.  In the past 2 weeks this patient has not felt down, depressed, hopeless, lost interest or pleasure in doing things or thought of harming herself or others and this was discussed over five minutes. The patient has not fallen in the last six months and has no loose rugs,  uneven floors or poor lighting at home.  Advance Care Planning discussion was held over 5 minutes.  The patient has no spiritual/Jew/cultural values or needs that we need to know. The patient does not feel threatened or abused physically, emotionally or sexually.  The patient feels safe in the home.  In the past month, there was not a day when the patient of anyone in the patient's family went hungry because there was not enough food.  The patient denies that they or the person with them has problems with hearing, speaking, reading, moving around or learning.  The patient states they are comfortable filling out medical forms. Alcohol usage was dicussed over five minutes.    Reviewed all medications by prescribing practitioner or clinical pharmacist (such as prescriptions, OTCs, herbal therapies and supplements) and documented in the medical record.    HPI  Taking meds as directed without tolerability or affordability issues; no complaints today.    Patient Care Team:  Prieto Patricia MD as PCP - General  Prieto Patricia MD as PCP - [employer] PCP     Review of Systems  General-denies weakness or myalgias; no unexplained fever or chills  Opth-no dry eyes, itchy eyes or blurry vision  ENT-No hearing loss, tinnitus or vertigo  Neck-no unexplained swelling in neck or pain  ENDO-no voice, skin, hair or change in temperature tolerance  HEM-no unexplained bruising or bleeding  PSYCH-mood is good; waking up rested without gasping or restless legs  NEURO- can  "occasionally wake up with bilateral occipital headaches but typically during the day;  denies eye strain;  also has neck pain with these headaches    Objective   Vitals:  /80 (BP Location: Left arm, Patient Position: Sitting)   Ht 1.588 m (5' 2.5\")   Wt 62.1 kg (136 lb 14.5 oz)   BMI 24.64 kg/m²       Physical Exam  General- well defined, well nourished, well hydrated individual in NAD  Head-normocephalic without masses or tenderness  Eyes-pupils equal round, reactive to light and accommodation, fundi with normal cup/disc ratio, conjunctiva without redness or discharge  Ears-normal pinnae, and canals, with normal landmarks and light reflex of tympanic membranes bilaterally  Nose-septum in the midline, normal mucosa bilaterally  Throat- without erythema or exudate, uvula in midline  Neck-supple without lymphadenopathy or thyromegaly; no carotid bruits  Heart- regular rate and rhythm, normal s1 and s2 without murmur or gallop;  no edema  Lungs-clear to auscultation  Abdomen-soft, positive bowel sounds, without masses, HSmegaly or pain   Neuro- alert and & oriented times three;  CN's II through XII grossly intact    Assessment/Plan   Problem List Items Addressed This Visit       Hyperlipidemia - Primary    Relevant Orders    Lipid Panel    Glucose    Osteoporosis    Relevant Medications    alendronate (Fosamax) 70 mg tablet    Postmenopausal    Encounter for immunization    Encounter for screening mammogram for malignant neoplasm of breast    Relevant Orders    BI mammo bilateral screening tomosynthesis    Chronic intractable headache    Relevant Orders    XR cervical spine 2-3 views    CT head wo IV contrast    Health care maintenance    Relevant Orders    BI mammo bilateral screening tomosynthesis    Glucose     Please provide us a copy of your Living Will and/or the Durable Power of  for Healthcare for your file.     Considering your health conditions, I recommend that you get the RSV vaccine at " your local pharmacy.    Please get your mammogram.    Follow up fasting (no alcohol for 48 hours and just water for 14 hours) in six months for your next routine appointment.  In general, take any medications on schedule (except for types of Insulin).

## 2024-08-27 ENCOUNTER — APPOINTMENT (OUTPATIENT)
Dept: PRIMARY CARE | Facility: CLINIC | Age: 74
End: 2024-08-27
Payer: MEDICARE

## 2024-08-27 VITALS
SYSTOLIC BLOOD PRESSURE: 135 MMHG | DIASTOLIC BLOOD PRESSURE: 80 MMHG | WEIGHT: 136.91 LBS | HEIGHT: 63 IN | BODY MASS INDEX: 24.26 KG/M2

## 2024-08-27 DIAGNOSIS — E78.5 HYPERLIPIDEMIA, UNSPECIFIED HYPERLIPIDEMIA TYPE: Primary | ICD-10-CM

## 2024-08-27 DIAGNOSIS — Z23 ENCOUNTER FOR IMMUNIZATION: ICD-10-CM

## 2024-08-27 DIAGNOSIS — Z12.31 ENCOUNTER FOR SCREENING MAMMOGRAM FOR MALIGNANT NEOPLASM OF BREAST: ICD-10-CM

## 2024-08-27 DIAGNOSIS — Z78.0 POSTMENOPAUSAL: ICD-10-CM

## 2024-08-27 DIAGNOSIS — G89.29 CHRONIC INTRACTABLE HEADACHE, UNSPECIFIED HEADACHE TYPE: ICD-10-CM

## 2024-08-27 DIAGNOSIS — M81.0 OSTEOPOROSIS, UNSPECIFIED OSTEOPOROSIS TYPE, UNSPECIFIED PATHOLOGICAL FRACTURE PRESENCE: ICD-10-CM

## 2024-08-27 DIAGNOSIS — R51.9 CHRONIC INTRACTABLE HEADACHE, UNSPECIFIED HEADACHE TYPE: ICD-10-CM

## 2024-08-27 DIAGNOSIS — Z00.00 HEALTH CARE MAINTENANCE: ICD-10-CM

## 2024-08-27 PROCEDURE — 99213 OFFICE O/P EST LOW 20 MIN: CPT | Performed by: FAMILY MEDICINE

## 2024-08-27 PROCEDURE — 3008F BODY MASS INDEX DOCD: CPT | Performed by: FAMILY MEDICINE

## 2024-08-27 PROCEDURE — 1170F FXNL STATUS ASSESSED: CPT | Performed by: FAMILY MEDICINE

## 2024-08-27 PROCEDURE — 1159F MED LIST DOCD IN RCRD: CPT | Performed by: FAMILY MEDICINE

## 2024-08-27 PROCEDURE — 1036F TOBACCO NON-USER: CPT | Performed by: FAMILY MEDICINE

## 2024-08-27 PROCEDURE — 1123F ACP DISCUSS/DSCN MKR DOCD: CPT | Performed by: FAMILY MEDICINE

## 2024-08-27 PROCEDURE — 80061 LIPID PANEL: CPT

## 2024-08-27 PROCEDURE — G0444 DEPRESSION SCREEN ANNUAL: HCPCS | Performed by: FAMILY MEDICINE

## 2024-08-27 PROCEDURE — 1160F RVW MEDS BY RX/DR IN RCRD: CPT | Performed by: FAMILY MEDICINE

## 2024-08-27 PROCEDURE — G0439 PPPS, SUBSEQ VISIT: HCPCS | Performed by: FAMILY MEDICINE

## 2024-08-27 PROCEDURE — 82947 ASSAY GLUCOSE BLOOD QUANT: CPT

## 2024-08-27 PROCEDURE — G0442 ANNUAL ALCOHOL SCREEN 15 MIN: HCPCS | Performed by: FAMILY MEDICINE

## 2024-08-27 RX ORDER — ALENDRONATE SODIUM 70 MG/1
70 TABLET ORAL
Qty: 12 TABLET | Refills: 1 | Status: SHIPPED | OUTPATIENT
Start: 2024-08-27

## 2024-08-27 ASSESSMENT — ENCOUNTER SYMPTOMS
OCCASIONAL FEELINGS OF UNSTEADINESS: 0
DEPRESSION: 0
LOSS OF SENSATION IN FEET: 0

## 2024-08-27 ASSESSMENT — ACTIVITIES OF DAILY LIVING (ADL)
GROCERY_SHOPPING: INDEPENDENT
TAKING_MEDICATION: INDEPENDENT
DOING_HOUSEWORK: INDEPENDENT
BATHING: INDEPENDENT
DRESSING: INDEPENDENT
MANAGING_FINANCES: INDEPENDENT

## 2024-08-27 ASSESSMENT — PATIENT HEALTH QUESTIONNAIRE - PHQ9
SUM OF ALL RESPONSES TO PHQ9 QUESTIONS 1 AND 2: 0
2. FEELING DOWN, DEPRESSED OR HOPELESS: NOT AT ALL
1. LITTLE INTEREST OR PLEASURE IN DOING THINGS: NOT AT ALL

## 2024-08-28 LAB
CHOLEST SERPL-MCNC: 160 MG/DL (ref 0–199)
CHOLESTEROL/HDL RATIO: 2.3
GLUCOSE SERPL-MCNC: 98 MG/DL (ref 74–99)
HDLC SERPL-MCNC: 69.9 MG/DL
LDLC SERPL CALC-MCNC: 70 MG/DL
NON HDL CHOLESTEROL: 90 MG/DL (ref 0–149)
TRIGL SERPL-MCNC: 101 MG/DL (ref 0–149)
VLDL: 20 MG/DL (ref 0–40)

## 2024-09-11 ENCOUNTER — APPOINTMENT (OUTPATIENT)
Dept: RADIOLOGY | Facility: CLINIC | Age: 74
End: 2024-09-11
Payer: MEDICARE

## 2024-09-12 ENCOUNTER — HOSPITAL ENCOUNTER (OUTPATIENT)
Dept: RADIOLOGY | Facility: CLINIC | Age: 74
Discharge: HOME | End: 2024-09-12
Payer: MEDICARE

## 2024-09-12 VITALS — WEIGHT: 135 LBS | BODY MASS INDEX: 23.92 KG/M2 | HEIGHT: 63 IN

## 2024-09-12 DIAGNOSIS — G89.29 CHRONIC INTRACTABLE HEADACHE, UNSPECIFIED HEADACHE TYPE: ICD-10-CM

## 2024-09-12 DIAGNOSIS — Z00.00 HEALTH CARE MAINTENANCE: ICD-10-CM

## 2024-09-12 DIAGNOSIS — R51.9 CHRONIC INTRACTABLE HEADACHE, UNSPECIFIED HEADACHE TYPE: ICD-10-CM

## 2024-09-12 DIAGNOSIS — Z12.31 ENCOUNTER FOR SCREENING MAMMOGRAM FOR MALIGNANT NEOPLASM OF BREAST: ICD-10-CM

## 2024-09-12 PROCEDURE — 77067 SCR MAMMO BI INCL CAD: CPT

## 2024-09-12 PROCEDURE — 70450 CT HEAD/BRAIN W/O DYE: CPT

## 2024-09-12 PROCEDURE — 72040 X-RAY EXAM NECK SPINE 2-3 VW: CPT | Performed by: RADIOLOGY

## 2024-09-12 PROCEDURE — 70450 CT HEAD/BRAIN W/O DYE: CPT | Performed by: RADIOLOGY

## 2024-09-12 PROCEDURE — 72040 X-RAY EXAM NECK SPINE 2-3 VW: CPT

## 2024-09-20 ENCOUNTER — TELEPHONE (OUTPATIENT)
Dept: PRIMARY CARE | Facility: CLINIC | Age: 74
End: 2024-09-20
Payer: MEDICARE

## 2024-09-20 NOTE — TELEPHONE ENCOUNTER
Patient calling with concerns about C-spine x-rays showing subluxation. Somehow Dr. Patricia hasn't seen this. She has quite a bit of neck pain and wants to know if she should see a specialist (chiro? Ortho?)

## 2024-09-26 ENCOUNTER — APPOINTMENT (OUTPATIENT)
Dept: PRIMARY CARE | Facility: CLINIC | Age: 74
End: 2024-09-26
Payer: MEDICARE

## 2024-09-29 DIAGNOSIS — E53.8 VITAMIN B12 DEFICIENCY: ICD-10-CM

## 2024-09-30 ENCOUNTER — TELEPHONE (OUTPATIENT)
Dept: PRIMARY CARE | Facility: CLINIC | Age: 74
End: 2024-09-30
Payer: MEDICARE

## 2024-09-30 DIAGNOSIS — E53.8 VITAMIN B12 DEFICIENCY: ICD-10-CM

## 2024-09-30 RX ORDER — PNV NO.95/FERROUS FUM/FOLIC AC 28MG-0.8MG
100 TABLET ORAL DAILY
Qty: 90 TABLET | Refills: 1 | OUTPATIENT
Start: 2024-09-30 | End: 2025-03-29

## 2024-09-30 NOTE — TELEPHONE ENCOUNTER
REFILL REQUEST    Med: Vitamin B-12  Med Dose: 100 mcg  Med Frequency: one tab daily    Pharmacy: [facility]  Pharmacy Address: [address] [place] [address] in [place]    LR: 12/29/2023  LV: 08/27/2024  NV: 10/04/2024

## 2024-10-01 ENCOUNTER — APPOINTMENT (OUTPATIENT)
Dept: PRIMARY CARE | Facility: CLINIC | Age: 74
End: 2024-10-01
Payer: MEDICARE

## 2024-10-01 RX ORDER — PNV NO.95/FERROUS FUM/FOLIC AC 28MG-0.8MG
100 TABLET ORAL DAILY
Qty: 90 TABLET | Refills: 1 | Status: SHIPPED | OUTPATIENT
Start: 2024-10-01

## 2024-10-04 ENCOUNTER — APPOINTMENT (OUTPATIENT)
Dept: CARDIOLOGY | Facility: CLINIC | Age: 74
End: 2024-10-04
Payer: MEDICARE

## 2024-10-04 ENCOUNTER — OFFICE VISIT (OUTPATIENT)
Dept: PRIMARY CARE | Facility: CLINIC | Age: 74
End: 2024-10-04
Payer: MEDICARE

## 2024-10-04 ENCOUNTER — LAB (OUTPATIENT)
Dept: LAB | Facility: LAB | Age: 74
End: 2024-10-04
Payer: MEDICARE

## 2024-10-04 VITALS
DIASTOLIC BLOOD PRESSURE: 60 MMHG | BODY MASS INDEX: 24.21 KG/M2 | SYSTOLIC BLOOD PRESSURE: 140 MMHG | TEMPERATURE: 97.7 F | WEIGHT: 136.69 LBS

## 2024-10-04 DIAGNOSIS — R51.9 CHRONIC INTRACTABLE HEADACHE, UNSPECIFIED HEADACHE TYPE: ICD-10-CM

## 2024-10-04 DIAGNOSIS — G89.29 CHRONIC INTRACTABLE HEADACHE, UNSPECIFIED HEADACHE TYPE: Primary | ICD-10-CM

## 2024-10-04 DIAGNOSIS — R51.9 CHRONIC INTRACTABLE HEADACHE, UNSPECIFIED HEADACHE TYPE: Primary | ICD-10-CM

## 2024-10-04 DIAGNOSIS — G89.29 CHRONIC INTRACTABLE HEADACHE, UNSPECIFIED HEADACHE TYPE: ICD-10-CM

## 2024-10-04 DIAGNOSIS — S13.100A SUBLUXATION OF CERVICAL VERTEBRA, INITIAL ENCOUNTER: ICD-10-CM

## 2024-10-04 LAB
CCP IGG SERPL-ACNC: <1 U/ML
ERYTHROCYTE [SEDIMENTATION RATE] IN BLOOD BY WESTERGREN METHOD: 3 MM/H (ref 0–30)

## 2024-10-04 PROCEDURE — 1036F TOBACCO NON-USER: CPT | Performed by: FAMILY MEDICINE

## 2024-10-04 PROCEDURE — 85652 RBC SED RATE AUTOMATED: CPT

## 2024-10-04 PROCEDURE — 1159F MED LIST DOCD IN RCRD: CPT | Performed by: FAMILY MEDICINE

## 2024-10-04 PROCEDURE — 86038 ANTINUCLEAR ANTIBODIES: CPT

## 2024-10-04 PROCEDURE — 99213 OFFICE O/P EST LOW 20 MIN: CPT | Performed by: FAMILY MEDICINE

## 2024-10-04 PROCEDURE — 36415 COLL VENOUS BLD VENIPUNCTURE: CPT

## 2024-10-04 PROCEDURE — 86200 CCP ANTIBODY: CPT

## 2024-10-04 PROCEDURE — 1160F RVW MEDS BY RX/DR IN RCRD: CPT | Performed by: FAMILY MEDICINE

## 2024-10-04 PROCEDURE — 1123F ACP DISCUSS/DSCN MKR DOCD: CPT | Performed by: FAMILY MEDICINE

## 2024-10-04 RX ORDER — NAPROXEN 500 MG/1
500 TABLET ORAL 2 TIMES DAILY PRN
Qty: 14 TABLET | Refills: 0 | Status: SHIPPED | OUTPATIENT
Start: 2024-10-04 | End: 2024-10-11

## 2024-10-04 NOTE — PROGRESS NOTES
Subjective   Patient ID: [id number]     Rosalba Lewis is a 74 y.o. female who presents for No chief complaint on file..    HPI  Still has headaches that are generalized and interfering with sleep, that started 2 to 3 months ago    Review of Systems  MSK-No locking, giving way/swelling of joints; no injury  NEURO- daily headaches as above, hx of concussion, fall or seizure in the last year   DERM-No rashes  GI- pain can sometimes precipitate nausea    Objective     /60 (BP Location: Right arm, Patient Position: Sitting)   Temp 36.5 °C (97.7 °F)   Wt 62 kg (136 lb 11 oz)   BMI 24.21 kg/m²      Physical Exam  Neck-supple without lymphadenopathy or thyromegaly; no carotid bruits  Neuro- alert and & oriented times three;  CN's II through XII grossly intact; DTR's 2+ and symmetrical;  Negative Romberg;  muscle strength is 5/5 bilateral upper and lower extremities      Assessment/Plan     Problem List Items Addressed This Visit       Chronic intractable headache - Primary    Relevant Medications    naproxen (Naprosyn) 500 mg tablet    Other Relevant Orders    Referral to Physical Therapy    Referral to Orthopaedic Surgery    Citrulline Antibody, IgG    KARON with Reflex to TAE    Sedimentation Rate     Other Visit Diagnoses       Subluxation of cervical vertebra, initial encounter            Please call if your headaches get worse in any way    Prieto Patricia MD

## 2024-10-07 LAB — ANA SER QL HEP2 SUBST: NEGATIVE

## 2024-10-21 ENCOUNTER — APPOINTMENT (OUTPATIENT)
Dept: CARDIOLOGY | Facility: CLINIC | Age: 74
End: 2024-10-21
Payer: MEDICARE

## 2024-10-21 VITALS
BODY MASS INDEX: 24.27 KG/M2 | WEIGHT: 137 LBS | HEIGHT: 63 IN | SYSTOLIC BLOOD PRESSURE: 138 MMHG | DIASTOLIC BLOOD PRESSURE: 62 MMHG | OXYGEN SATURATION: 95 % | HEART RATE: 80 BPM

## 2024-10-21 DIAGNOSIS — I47.10 SVT (SUPRAVENTRICULAR TACHYCARDIA) (CMS-HCC): Primary | ICD-10-CM

## 2024-10-21 PROCEDURE — 3008F BODY MASS INDEX DOCD: CPT | Performed by: INTERNAL MEDICINE

## 2024-10-21 PROCEDURE — 1159F MED LIST DOCD IN RCRD: CPT | Performed by: INTERNAL MEDICINE

## 2024-10-21 PROCEDURE — 1036F TOBACCO NON-USER: CPT | Performed by: INTERNAL MEDICINE

## 2024-10-21 PROCEDURE — 99213 OFFICE O/P EST LOW 20 MIN: CPT | Performed by: INTERNAL MEDICINE

## 2024-10-21 PROCEDURE — 1123F ACP DISCUSS/DSCN MKR DOCD: CPT | Performed by: INTERNAL MEDICINE

## 2024-10-21 PROCEDURE — G2211 COMPLEX E/M VISIT ADD ON: HCPCS | Performed by: INTERNAL MEDICINE

## 2024-10-21 NOTE — PATIENT INSTRUCTIONS

## 2024-10-21 NOTE — PROGRESS NOTES
"Chief Complaint:   Please See Below      History Of Present Illness:    Rosalba Lewis is a 74 y.o. female presenting with SVT.    This 74 hyperlipidemic woman has a history of SVT and returns to the office in routine follow-up.  Overall she feels well.  The patient's stress echo in September 2023 disclosed no ischemia at 7 METS.    Three or four days a week she plays pickleball.  She does \"land aerobics\", or water aerobics twice or 3 times a week.  She also walks her dog.       Last Recorded Vitals:  Vitals:    10/21/24 1357   BP: 138/62   BP Location: Left arm   Patient Position: Sitting   Pulse: 80   SpO2: 95%   Weight: 62.1 kg (137 lb)   Height: 1.6 m (5' 3\")       Past Medical History:  She has a past medical history of Acute upper respiratory infection, unspecified (03/14/2019), Body mass index (BMI) 27.0-27.9, adult (06/24/2021), Conjunctival hemorrhage, right eye (06/22/2017), Cough, unspecified (12/15/2014), Dislocation of other parts of thorax, sequela (02/10/2020), Elevated blood-pressure reading, without diagnosis of hypertension (06/14/2017), Encounter for other preprocedural examination (11/18/2021), Encounter for other preprocedural examination (11/29/2021), Encounter for other preprocedural examination (05/01/2018), Erythematous condition, unspecified (08/24/2018), Noninfective gastroenteritis and colitis, unspecified (12/15/2014), Other conditions influencing health status (12/11/2013), Other specified health status (09/10/2015), Other specified postprocedural states, Other specified soft tissue disorders (08/24/2018), Pain in left toe(s) (09/05/2017), Pain in right toe(s) (08/24/2018), Pain in unspecified toe(s) (08/24/2018), Personal history of diseases of the blood and blood-forming organs and certain disorders involving the immune mechanism (05/06/2019), Personal history of other diseases of the nervous system and sense organs, Personal history of other diseases of the respiratory system " (12/15/2014), Personal history of other diseases of the respiratory system, Personal history of other endocrine, nutritional and metabolic disease (2015), Personal history of other medical treatment (2017), Personal history of other mental and behavioral disorders (2022), Personal history of other specified conditions (2021), Personal history of other specified conditions (10/14/2015), Personal history of other specified conditions, Personal history of pneumonia (recurrent) (2016), Pleurodynia (07/10/2018), Right knee pain (2023), Status post left knee replacement (2023), Tinnitus, bilateral (2017), Unspecified asthma, uncomplicated (HHS-HCC) (01/15/2019), Unspecified asthma, uncomplicated (HHS-HCC) (2022), and Urinary tract infection, site not specified (2017).    Past Surgical History:  She has a past surgical history that includes Cervical biopsy w/ loop electrode excision (2016); Total knee arthroplasty (Left, ); and Colposcopy ().      Social History:  She reports that she quit smoking about 48 years ago. Her smoking use included cigarettes. She has never used smokeless tobacco. She reports that she does not currently use alcohol. She reports that she does not use drugs.    Family History:  Family History   Problem Relation Name Age of Onset    Hypertension Mother      Alcohol abuse Father      Parkinsonism Sister Shiela          in 70's    No Known Problems Brother Abhinav '54         Allergies:  Clindamycin    Outpatient Medications:  Current Outpatient Medications   Medication Instructions    atorvastatin (LIPITOR) 10 mg, oral, Nightly    coenzyme Q-10 200 mg, Daily RT    cyanocobalamin (VITAMIN B-12) 100 mcg, oral, Daily    ferrous sulfate 325 (65 Fe) MG tablet 1 tablet, Daily    magnesium oxide (Mag-Ox) 400 mg (241.3 mg magnesium) tablet 1 tablet, Daily    mesalamine ER (Apriso) 0.375 gram 24 hr capsule 3 capsules, Daily     "multivitamin tablet Take by mouth.       Physical Exam:  CHEST: Clear to auscultation  CARDIAC: Regular rhythm and rate, normal S1 and S2, no murmur rub or gallop; carotids are brisk without bruits, PMI is not displaced  ABDOMEN: Active bowel sounds, no tenderness, no evidence of ascites  EXTREMITIES: No clubbing, cyanosis, edema, or tenderness       Last Labs:  CBC -  Lab Results   Component Value Date    WBC 6.9 04/23/2024    HGB 13.0 04/23/2024    HCT 38.0 04/23/2024    MCV 94 04/23/2024     04/23/2024       CMP -  Lab Results   Component Value Date    CALCIUM 9.0 04/23/2024    PROT 7.7 04/23/2024    ALBUMIN 4.3 04/23/2024    AST 21 04/23/2024    ALT 15 04/23/2024    ALKPHOS 54 04/23/2024    BILITOT 0.6 04/23/2024       LIPID PANEL -   Lab Results   Component Value Date    CHOL 160 08/27/2024    CHOL 137 06/05/2023    TRIG 101 08/27/2024    TRIG 80 06/05/2023    HDL 69.9 08/27/2024    HDL 63 (A) 06/05/2023    CHHDL 2.3 08/27/2024    CHHDL 2.2 06/05/2023    LDLF 74 12/12/2022    VLDL 20 08/27/2024    NHDL 90 08/27/2024       RENAL FUNCTION PANEL -   Lab Results   Component Value Date    GLUCOSE 98 08/27/2024     04/23/2024    K 3.7 04/23/2024     04/23/2024    CO2 28 04/23/2024    ANIONGAP 9 (L) 04/23/2024    BUN 8 04/23/2024    CREATININE 0.60 04/23/2024    CALCIUM 9.0 04/23/2024    ALBUMIN 4.3 04/23/2024        No results found for: \"BNP\", \"HGBA1C\"      Lab review: I have Chemistry CMP:   Lab Results   Component Value Date    ALBUMIN 4.3 04/23/2024    CALCIUM 9.0 04/23/2024    CO2 28 04/23/2024    CREATININE 0.60 04/23/2024    GLUCOSE 98 08/27/2024    BILITOT 0.6 04/23/2024    PROT 7.7 04/23/2024    ALT 15 04/23/2024    AST 21 04/23/2024    ALKPHOS 54 04/23/2024   , Chemistry BMP   Lab Results   Component Value Date    GLUCOSE 98 08/27/2024    CALCIUM 9.0 04/23/2024    CO2 28 04/23/2024    CREATININE 0.60 04/23/2024   , and CBC:  Lab Results   Component Value Date    WBC 6.9 04/23/2024    RBC " 4.06 04/23/2024    HGB 13.0 04/23/2024    HCT 38.0 04/23/2024    MCV 94 04/23/2024    MCH 32.0 04/23/2024    MCHC 34.2 04/23/2024    RDW 12.7 04/23/2024    NRBC 0.0 04/23/2024       Assessment/Plan   Assessment & Plan  SVT (supraventricular tachycardia) (CMS-HCC)               Noe Montgomery MD

## 2024-10-23 NOTE — PROGRESS NOTES
Physical Therapy Evaluation    Patient Name: Rosalba Lewis  MRN: [id number]  Today's Date: 10/24/2024  Time Calculation  Start Time: 0847  Stop Time: 0926  Time Calculation (min): 39 min  PT Evaluation Time Entry  PT Evaluation (Low) Time Entry: 20  PT Therapeutic Procedures Time Entry  Manual Therapy Time Entry: 8  Therapeutic Exercise Time Entry: 3  Therapeutic Activity Time Entry: 8                   Current Problem  1. Chronic intractable headache, unspecified headache type  Referral to Physical Therapy    Follow Up In Physical Therapy      2. Neck pain  Follow Up In Physical Therapy        Insurance    Insurance reviewed   Visit number: 1  Approved number of visits: BMN      MEDICARE BMN PT OT COPAY 0 DED 0 ,COVERAGE 96 OOP 1500(145) NO AUTH REQ,REF# [id number] // Britt confirmed 10/17/24 9:10am   Subjective   General:  Patient is a 74 year old female  presenting with complaints of chronic headaches. She reports that her neck has always been kind of sore. The headaches will creep up or come on at once. They don't seem to have a particular pattern. She saw her PCP and she was referred to orthopedic surgery and she is scheduled for this soon.  She is active. The headaches started last May. There was no fall or injury. She sees a massage therapist. And this seems to help. The headaches will come and go will be most of the time. She has tried different pillows to help her neck. She was prescribed naproxen and this seems to help. Most of her neck discomfort is along the back and sides.  Patient feels like her headaches are getting worse. She reports that when she was on vacation in July her headaches were somewhat.  She reports a diagnosis of supraventricular tachycardia but this is controlled and she has been evaluated by a cardiologist. She reports a history of ulcerative colitis. The patient's goals for therapy are to alleviate the pain and headaches. She has a hard time pinning down particular  triggers for her headaches. She denies any history of jaw pain or dental issues. She denies any previous neck or head related surgeries.    Precautions:  None  Pain:  9/10, at best 5/10  Reviewed medical screening form with pt and medical screening assessed    Imaging:   Narrative & Impression   Interpreted By:  Lowell Gilman  and Tripp España   STUDY:  CT HEAD WO IV CONTRAST;  9/12/2024 10:02 am      INDICATION:  Signs/Symptoms:acute intractable headaches.      COMPARISON:  None.      ACCESSION NUMBER(S):  [id number]      ORDERING CLINICIAN:  NANCY GRAHAM      TECHNIQUE:  Axial CT images of the head were obtained without intravenous  contrast administration.   IMPRESSION:  There is mild brain parenchymal volume loss.      Mild nonspecific white matter changes are noted within cerebral  hemispheres bilaterally which while nonspecific, given the patient's  age, may represent sequelae of small-vessel ischemic change.      I personally reviewed the images/study and I agree with the findings  as stated by Resident Taco Almaguer. This study was interpreted at  [facility], [place], [place].      MACRO:  None.      Signed by: Lowell Gilman 9/13/2024 6:21 AM  Interpreted By:  Elma Jimenez,   STUDY:  XR CERVICAL SPINE 2-3 VIEWS;  9/12/2024 10:05 am 2 views.      INDICATION:  Signs/Symptoms:bilateral headaches and neck pain.      COMPARISON:  None.      ACCESSION NUMBER(S):  [id number]      ORDERING CLINICIAN:  NANYC GRAHAM      FINDINGS:          There is no fracture or prevertebral soft tissue swelling.      There is 3 mm anterior subluxation C4 with respect to C5.  There is 2 mm anterior subluxation of C7 with respect to T1.      There is mild anterior osteophytic spurring at C4-5.  There is disc space narrowing with moderate anterior osteophytic  spurring at C5-6 and C6-7.      IMPRESSION:  Degenerative change with multilevel subluxations.      MACRO:  None      Signed by: Elma  Tony 9/13/2024 10:10 AM  Objective   Observation:Unremarkable    Screening:   Upper Quarter Screen  Myotomes:5/5 bilaterally  Dermatomes: deferred  Biceps Reflex: R- Normal L- normal  Triceps Reflex:R-normal L-normal  Sahu's Signs: Negative bilaterally  Invertor Supinator Sign: Negative bilaterally    Sharp Carmine Test: Negative  Alar Ligament Test: Negative    Cervical Extension Quadrant Test: Deferred  Distraction: deferred  Spurlings: Deferred      Cervical AROM (* indicates pain)  Flexion-WNL  Extension-40 degrees*  Lateral Flexion L 25 degrees pain ; R 35 degrees  Rotation L 50 degrees* ; R 60 degrees                Palpation: Increased resting tension through bilateral posterior and lateral cervical musculature, superior shoulder musculature, more on left than right          Outcome Measures:  Other Measures  Neck Disability Index: 14     Treatment:   -Patient education regarding recovery timeline, rehabilitation process and rehabilitation timeline, home exercise program demonstration and construction, review of precautions, and long term strategies to maximize functional capacity and functional independence 8 minutes    Manual 8 minutes  -STM through bilateral posterior cervical musculature, bilateral superior shoulder musculature     Seated scapular retraction holds with deep breathing 1x5 holds with 3 breaths each    EDUCATION/HEP:  Access Code: [email or web address]  URL: [email or web address]  Date: 10/24/2024  Prepared by: Bhaskar Lozano    Exercises  - Seated Scapular Retraction  - 2 x daily - 7 x weekly - 5 reps - 5 hold    *Use of heating pad periodically throughout the day through neck region  Assessment:  Patient is a 74 year old female  presenting with complaints of chronic headaches. Patient presents with the following primary physical and functional impairments and limitations:  limited cervical rotation AROM bilaterally, limited cervical lateral flexion ROM bilaterally, limited  cervical extension ROM bilaterally, increased muscular guarding through bilateral posterior and lateral cervical musculature and superior shoulder musculature, and chronic headaches limiting subjective functional capacity.  Patient tolerated today's evaluation and treatment performed well. Patient is displaying good prognosis for physical therapy care based upon subjective and objective assessment. Patient will benefit from skilled intervention to address the above mentioned impairments to maximize functional capacity and quality of life. Patient appeared to understand all education provided. Patient will present for initial evaluation with orthopedic surgery next week and will follow up for progressive physical therapy moving forward at that time.  No adverse reactions were observed or reported from patient at conclusion of today's session.       Physical Therapy Problem List  Chronic headaches  2.  Limitations with cervical rotation/lateral flexion and extension          Clinical Presentation: Stable     Level of Complexity: Low     Goals:  Patient will display pain free and symmetrical active cervical spine flexion, extension, rotation, and sidebending ROM, indicating improved capacity for pain free functional use of head and neck  Patient will display improvement on the NDI of at least 7, indicating improved subjective rating of function  Patient will report at least 25% reduction in headache frequency and intensity  Patient will report at least 25% improvement in sleep quality secondary to reduction in headache intensity    Plan  1x/week for 6 visits     Skilled therapeutic intervention to address the above mentioned physical and functional impairments and limitations including, but not limited to: patient education, therapeutic exercise, therapeutic activity, manual therapy, body mechanics training, dry needling, blood flow restriction training, instrumented soft tissue mobilization, manual soft tissue  mobilization, gait retraining, biofeedback, cryotherapy, electrical stimulation, home program development, hot pack, taping, neuromuscular re-education, self-care/home management, and vasopneumatic compression.

## 2024-10-24 ENCOUNTER — EVALUATION (OUTPATIENT)
Dept: PHYSICAL THERAPY | Facility: CLINIC | Age: 74
End: 2024-10-24
Payer: MEDICARE

## 2024-10-24 DIAGNOSIS — R51.9 CHRONIC INTRACTABLE HEADACHE, UNSPECIFIED HEADACHE TYPE: Primary | ICD-10-CM

## 2024-10-24 DIAGNOSIS — M54.2 NECK PAIN: ICD-10-CM

## 2024-10-24 DIAGNOSIS — G89.29 CHRONIC INTRACTABLE HEADACHE, UNSPECIFIED HEADACHE TYPE: Primary | ICD-10-CM

## 2024-10-24 PROCEDURE — 97140 MANUAL THERAPY 1/> REGIONS: CPT | Mod: GP | Performed by: PHYSICAL THERAPIST

## 2024-10-24 PROCEDURE — 97161 PT EVAL LOW COMPLEX 20 MIN: CPT | Mod: GP | Performed by: PHYSICAL THERAPIST

## 2024-10-24 ASSESSMENT — ENCOUNTER SYMPTOMS
OCCASIONAL FEELINGS OF UNSTEADINESS: 0
LOSS OF SENSATION IN FEET: 0
DEPRESSION: 0

## 2024-10-24 ASSESSMENT — PAIN SCALES - GENERAL: PAINLEVEL_OUTOF10: 9

## 2024-10-24 ASSESSMENT — PATIENT HEALTH QUESTIONNAIRE - PHQ9
SUM OF ALL RESPONSES TO PHQ9 QUESTIONS 1 AND 2: 0
1. LITTLE INTEREST OR PLEASURE IN DOING THINGS: NOT AT ALL
2. FEELING DOWN, DEPRESSED OR HOPELESS: NOT AT ALL

## 2024-10-24 ASSESSMENT — PAIN - FUNCTIONAL ASSESSMENT: PAIN_FUNCTIONAL_ASSESSMENT: 0-10

## 2024-10-30 ENCOUNTER — OFFICE VISIT (OUTPATIENT)
Dept: ORTHOPEDIC SURGERY | Facility: CLINIC | Age: 74
End: 2024-10-30
Payer: MEDICARE

## 2024-10-30 VITALS — WEIGHT: 134 LBS | HEIGHT: 63 IN | BODY MASS INDEX: 23.74 KG/M2

## 2024-10-30 DIAGNOSIS — R51.9 CHRONIC INTRACTABLE HEADACHE, UNSPECIFIED HEADACHE TYPE: ICD-10-CM

## 2024-10-30 DIAGNOSIS — G89.29 CHRONIC INTRACTABLE HEADACHE, UNSPECIFIED HEADACHE TYPE: ICD-10-CM

## 2024-10-30 PROCEDURE — 99214 OFFICE O/P EST MOD 30 MIN: CPT | Performed by: PHYSICIAN ASSISTANT

## 2024-10-30 PROCEDURE — 3008F BODY MASS INDEX DOCD: CPT | Performed by: PHYSICIAN ASSISTANT

## 2024-10-30 PROCEDURE — 1159F MED LIST DOCD IN RCRD: CPT | Performed by: PHYSICIAN ASSISTANT

## 2024-10-30 PROCEDURE — 99204 OFFICE O/P NEW MOD 45 MIN: CPT | Performed by: PHYSICIAN ASSISTANT

## 2024-10-30 PROCEDURE — 1125F AMNT PAIN NOTED PAIN PRSNT: CPT | Performed by: PHYSICIAN ASSISTANT

## 2024-10-30 PROCEDURE — 1123F ACP DISCUSS/DSCN MKR DOCD: CPT | Performed by: PHYSICIAN ASSISTANT

## 2024-10-30 PROCEDURE — 1036F TOBACCO NON-USER: CPT | Performed by: PHYSICIAN ASSISTANT

## 2024-10-30 ASSESSMENT — PAIN SCALES - GENERAL: PAINLEVEL_OUTOF10: 7

## 2024-10-30 ASSESSMENT — PAIN - FUNCTIONAL ASSESSMENT: PAIN_FUNCTIONAL_ASSESSMENT: 0-10

## 2024-11-01 ENCOUNTER — APPOINTMENT (OUTPATIENT)
Dept: PHYSICAL THERAPY | Facility: CLINIC | Age: 74
End: 2024-11-01
Payer: MEDICARE

## 2024-11-01 DIAGNOSIS — R51.9 CHRONIC INTRACTABLE HEADACHE, UNSPECIFIED HEADACHE TYPE: Primary | ICD-10-CM

## 2024-11-01 DIAGNOSIS — M54.2 NECK PAIN: ICD-10-CM

## 2024-11-01 DIAGNOSIS — G89.29 CHRONIC INTRACTABLE HEADACHE, UNSPECIFIED HEADACHE TYPE: Primary | ICD-10-CM

## 2024-11-01 PROCEDURE — 97140 MANUAL THERAPY 1/> REGIONS: CPT | Mod: GP | Performed by: PHYSICAL THERAPIST

## 2024-11-01 PROCEDURE — 97110 THERAPEUTIC EXERCISES: CPT | Mod: GP | Performed by: PHYSICAL THERAPIST

## 2024-11-07 ENCOUNTER — APPOINTMENT (OUTPATIENT)
Dept: PHYSICAL THERAPY | Facility: CLINIC | Age: 74
End: 2024-11-07
Payer: MEDICARE

## 2024-11-07 DIAGNOSIS — M54.2 NECK PAIN: ICD-10-CM

## 2024-11-07 DIAGNOSIS — G89.29 CHRONIC INTRACTABLE HEADACHE, UNSPECIFIED HEADACHE TYPE: Primary | ICD-10-CM

## 2024-11-07 DIAGNOSIS — R51.9 CHRONIC INTRACTABLE HEADACHE, UNSPECIFIED HEADACHE TYPE: Primary | ICD-10-CM

## 2024-11-07 PROCEDURE — 97110 THERAPEUTIC EXERCISES: CPT | Mod: GP | Performed by: PHYSICAL THERAPIST

## 2024-11-07 PROCEDURE — 97140 MANUAL THERAPY 1/> REGIONS: CPT | Mod: GP | Performed by: PHYSICAL THERAPIST

## 2024-11-07 NOTE — PROGRESS NOTES
Physical Therapy Treatment    Patient Name: Rosalba Lewis  MRN: [id number]  Today's Date: 11/7/2024  Time Calculation  Start Time: 0931  Stop Time: 1002  Time Calculation (min): 31 min     PT Therapeutic Procedures Time Entry  Manual Therapy Time Entry: 15  Therapeutic Exercise Time Entry: 16                 Insurance reviewed   Visit number: 3  Approved number of visits: BMN       MEDICARE BMN PT OT COPAY 0 DED 0 ,COVERAGE 96 OOP 1500(145) NO AUTH REQ,REF# [id number] // Britt confirmed 10/17/24 9:10am      Current Problem  1. Chronic intractable headache, unspecified headache type        2. Neck pain              Subjective   General  Patient reports that she played a lot of pickleball yesterday and had some soreness through her neck in the middle of the night but this is better now. Overall, she has noticed reduction in her headaches overall. She also reports that the chin tuck exercise helps relieve neck stiffness.     Precautions  Assessment: This patient with basically arthritic degenerative neck pain.  She is also complaining of headache and I told her that they could be tension type headaches.  From the arthritic degenerative changes.     Plan: Patient is can to continue with conservative care physical therapy.  If she develops any neurological symptoms she can return she will need an MRI. -Pino Clark PA-C Note 10/30  Pain  5/10 neck bilaterally  3/10 headache, general, bilateral    Objective   -Check range, check upper cervical mobility, check repeated motions  -Cervical Rotation L- 65 degrees, end range discomfort (78 degrees following treatment) R- 70 degrees (80 degrees following treatment)    Treatments:  -Cervical Traction, retraction 4x5 (extension on 5th)  -Seated thoracic rotation repeated 1x5 bilaterally  Seated thoracic rotation repeated with stable head bilaterally 1x5  Seated repeated R thoracic rotation with therapist overpressure to tolerance 1x5    Manual 15  STM through  left posterior cervical musculature  L to R and R to L segmental sidegliding C3-7  L first rib joint mobilization      OP EDUCATION/HEP:  Access Code: [email or web address]  URL: [email or web address]  Date: 11/01/2024  Prepared by: Bhaskar Lozano    Exercises  - Seated Passive Cervical Retraction  - 5 x daily - 7 x weekly - 5 reps  - Supine Chin Tuck  - 2 x daily - 7 x weekly - 10 reps    Add in repeated thoracic rotation bilaterally    Assessment   Patient tolerated treatment well. Patient continues to display marked improvement in cervical rotation AROM bilaterally. Patient is continuing to display mild end range discomfort with left cervical rotation, although this improves with treatment. Patient does continue to display reduced accessory joint mobility of the left side C3-7 cervical facet joints. This responded well to joint mobilization. Overall, patient is responding well to approach utilizing repeated cervical spine motions and manual therapy to the cervicothoracic region. Patient appeared to understand all education provided. Will continue to benefit from skilled intervention to address the above mentioned impairments and limitations. No adverse reactions were observed or reported from patient at the conclusion of today's session.             Physical Therapy Problem List  Chronic headaches  2.  Limitations with cervical rotation/lateral flexion and extension   Plan   Potentially incorporate dry needling for muscular tension relief  Add in postural endurance training

## 2024-11-14 ENCOUNTER — APPOINTMENT (OUTPATIENT)
Dept: PHYSICAL THERAPY | Facility: CLINIC | Age: 74
End: 2024-11-14
Payer: MEDICARE

## 2024-11-15 ENCOUNTER — TREATMENT (OUTPATIENT)
Dept: PHYSICAL THERAPY | Facility: CLINIC | Age: 74
End: 2024-11-15
Payer: MEDICARE

## 2024-11-15 DIAGNOSIS — G89.29 CHRONIC INTRACTABLE HEADACHE, UNSPECIFIED HEADACHE TYPE: ICD-10-CM

## 2024-11-15 DIAGNOSIS — R51.9 CHRONIC INTRACTABLE HEADACHE, UNSPECIFIED HEADACHE TYPE: ICD-10-CM

## 2024-11-15 DIAGNOSIS — M54.2 NECK PAIN: ICD-10-CM

## 2024-11-15 PROCEDURE — 97140 MANUAL THERAPY 1/> REGIONS: CPT | Mod: GP | Performed by: PHYSICAL THERAPIST

## 2024-11-15 PROCEDURE — 97110 THERAPEUTIC EXERCISES: CPT | Mod: GP | Performed by: PHYSICAL THERAPIST

## 2024-11-15 NOTE — PROGRESS NOTES
Physical Therapy Treatment    Patient Name: Rosalba Lewis  MRN: [id number]  Today's Date: 11/15/2024  Time Calculation  Start Time: 1150  Stop Time: 1222  Time Calculation (min): 32 min     PT Therapeutic Procedures Time Entry  Manual Therapy Time Entry: 20  Therapeutic Exercise Time Entry: 12                 Insurance reviewed   Visit number: 3  Approved number of visits: BMN       MEDICARE BMN PT OT COPAY 0 DED 0 ,COVERAGE 96 OOP 1500(145) NO AUTH REQ,REF# [id number]/ALL // Britt confirmed 10/17/24 9:10am      Current Problem  1. Chronic intractable headache, unspecified headache type  Follow Up In Physical Therapy      2. Neck pain  Follow Up In Physical Therapy              Subjective   General  Patient reports  compliance with exercises. Patient reports that her headaches are happening less frequently. She will go a whole day without a headache. She sometimes will wake up with a headache and a stiff neck. She reports that her neck has been doing OK. It is not quite as nagging. Her neck does not bother her during pickleball. She will feel it somewhat with water aerobics. She will feel it turning it to the right.     Precautions  Assessment: This patient with basically arthritic degenerative neck pain.  She is also complaining of headache and I told her that they could be tension type headaches.  From the arthritic degenerative changes.     Plan: Patient is can to continue with conservative care physical therapy.  If she develops any neurological symptoms she can return she will need an MRI. -Pino Clark PA-C Note 10/30  Pain  5/10 neck bilaterally  3/10 headache, general, bilateral    Objective     -Cervical Rotation L- 70 degrees, end range discomfort (75 degrees after intramuscular dry needling  R- 75 degrees (80 degrees following treatment)    Treatments:      Doorway Stretch 3x5 breaths  Prone Scapular retraction with tactile cueing 3x5    Manual 20 minutes  STM through thoracic paraspinal  and periscapular musculature  CPA joint mobilizations grade II-III CT junction to T10        Intramuscular dry needling       Patient does not present with any of the following precautions/contraindications for dry needling treatment: active infection, presence of open wound, personal/family history of blood clotting disorder, active malignancy, extreme aversion to needles or previous/recent surgery in the target area. Patient provided informed consent for treatment. Patient tolerated treatment well and did not report adverse reactions. No adverse reactions were observed.     Needle Placement:    Needle #1: 15mm needle placed in mid-substance of right upper trapezius, angled 30 degrees superior from perpendicular, no needle manipulation performed, maximal superior grasp with non-needling hand maintained on upper trapezius muscle to ensure safe environment for needle placement    Needle #2: 15mm needle placed in lateral 1/3 of right upper trapezius, angled 30 degrees superior from perpendicular, no needle manipulation performed, maximal superior grasp with non-needling hand maintained on upper trapezius muscle to ensure safe environment for needle placement    Needle #3: 15mm needle placed in mid-substance of left upper trapezius, angled 30 degrees superior from perpendicular, no needle manipulation performed, maximal superior grasp with non-needling hand maintained on upper trapezius muscle to ensure safe environment for needle placement    Needle #4: 15mm needle placed in mid-substance of left lateral 1/3 of trapezius, angled 30 degrees superior from perpendicular, no needle manipulation performed, maximal superior grasp with non-needling hand maintained on upper trapezius muscle to ensure safe environment for needle placement     All needles inserted and removed individually      Area prepped and cleaned with isopropyl alcohol pad in sterile fashion. Therapist wore appropriate PPE. Removed without adverse  reaction. Patient educated regarding potential post-needling soreness and self-management strategies     OP EDUCATION/HEP:  Access Code: [email or web address]  URL: [email or web address]  Date: 11/01/2024  Prepared by: Bhaskar Lozano    Exercises  - Seated Passive Cervical Retraction  - 5 x daily - 7 x weekly - 5 reps  - Supine Chin Tuck  - 2 x daily - 7 x weekly - 10 reps    Add in repeated thoracic rotation bilaterally  Access Code: [email or web address]  URL: [email or web address]  Date: 11/15/2024  Prepared by: Bhaskar Lozano    Exercises  - Doorway Pec Stretch at 60 Degrees Abduction with Arm Straight  - 1 x daily - 7 x weekly - 3 sets - 30 hold  Assessment   Patient tolerated treatment well. Patient tolerated introduction of intramuscular dry needling well. Displayed improved bilateral cervical rotation ROM following treatment. Also reported reduction in baseline left neck stiffness. Tolerated introduction of thoracic spine joint mobilizations and anterior shoulder/chest muscle flexibility training well. Continue to display mild limitations with left cervical rotation AROM compared to right, but this has steadily improved throughout course of treatment.  Patient appeared to understand all education provided. Will continue to benefit from skilled intervention to address the above mentioned impairments and limitations. No adverse reactions were observed or reported from patient at the conclusion of today's session.             Physical Therapy Problem List  Chronic headaches  2.  Limitations with cervical rotation/lateral flexion and extension   Plan   Assess response to dry needling, progress postural training

## 2024-11-20 NOTE — PROGRESS NOTES
Physical Therapy Treatment    Patient Name: Rosalba Lewis  MRN: [id number]  Today's Date: 11/21/2024  Time Calculation  Start Time: 0846  Stop Time: 0928  Time Calculation (min): 42 min     PT Therapeutic Procedures Time Entry  Manual Therapy Time Entry: 12  Therapeutic Exercise Time Entry: 17  Therapeutic Activity Time Entry: 10                 Insurance reviewed   Visit number: 4  Approved number of visits: BMN       MEDICARE BMN PT OT COPAY 0 DED 0 ,COVERAGE 96 OOP 1500(145) NO AUTH REQ,REF# [id number] // Britt confirmed 10/17/24 9:10am      Current Problem  1. Chronic intractable headache, unspecified headache type        2. Neck pain                  Subjective   General  Patient reports that she has not had headache in the past week. Patient reports that her neck feels tight but this is bearable. She reports an flare up of right sided lower back pain. There is no pain down her leg. She has a hard time sitting for a while. She has been unable to play pickleball. She has some exercises from a previous bout of PT for this and these have helped some.     Precautions  Assessment: This patient with basically arthritic degenerative neck pain.  She is also complaining of headache and I told her that they could be tension type headaches.  From the arthritic degenerative changes.     Plan: Patient is can to continue with conservative care physical therapy.  If she develops any neurological symptoms she can return she will need an MRI. -Pino Clark PA-C Note 10/30  Pain  3-4/10 neck bilaterally  9/10 back discomfort  0/10 headache    Objective     -Cervical Rotation L- 75 degrees, end range discomfort   R- 80 degrees   Lumbar spine AROM  Flexion: 75% full range, discomfort  Extension: 80% full range, better  Side Shift: R-75% full range L- 75% full range, right lower back discomfort  Treatments:  Discussion regarding lower back discomfort, strategies for long bout of travel coming up 10  minutes    Incline lying Horizontal Extension YTB 2x10  Seated scapular retraction with shoulder ER YTB 2x12  Hooklying cervical retraction 2x5    Manual 12 minutes  STM through thoracic paraspinal and periscapular musculature          Intramuscular dry needling       Patient does not present with any of the following precautions/contraindications for dry needling treatment: active infection, presence of open wound, personal/family history of blood clotting disorder, active malignancy, extreme aversion to needles or previous/recent surgery in the target area. Patient provided informed consent for treatment. Patient tolerated treatment well and did not report adverse reactions. No adverse reactions were observed.     Needle Placement:    Needle #1: 15mm needle placed in mid-substance of right upper trapezius, angled 30 degrees superior from perpendicular, no needle manipulation performed, maximal superior grasp with non-needling hand maintained on upper trapezius muscle to ensure safe environment for needle placement    Needle #2: 15mm needle placed in lateral 1/3 of right upper trapezius, angled 30 degrees superior from perpendicular, no needle manipulation performed, maximal superior grasp with non-needling hand maintained on upper trapezius muscle to ensure safe environment for needle placement    Needle #3: 15mm needle placed in mid-substance of left upper trapezius, angled 30 degrees superior from perpendicular, no needle manipulation performed, maximal superior grasp with non-needling hand maintained on upper trapezius muscle to ensure safe environment for needle placement    Needle #4: 15mm needle placed in lateral 1/3 of left upper trapezius, angled 30 degrees superior from perpendicular, no needle manipulation performed, maximal superior grasp with non-needling hand maintained on upper trapezius muscle to ensure safe environment for needle placement     All needles inserted and removed individually      Area  prepped and cleaned with isopropyl alcohol pad in sterile fashion. Therapist wore appropriate PPE. Removed without adverse reaction. Patient educated regarding potential post-needling soreness and self-management strategies     OP EDUCATION/HEP:  Access Code: [email or web address]  URL: [email or web address]  Date: 11/01/2024  Prepared by: Bhaskar Lozano    Exercises  - Seated Passive Cervical Retraction  - 5 x daily - 7 x weekly - 5 reps  - Supine Chin Tuck  - 2 x daily - 7 x weekly - 10 reps    Add in repeated thoracic rotation bilaterally  Access Code: [email or web address]  URL: [email or web address]  Date: 11/15/2024  Prepared by: Bhaskar Lozano    Exercises  - Doorway Pec Stretch at 60 Degrees Abduction with Arm Straight  - 1 x daily - 7 x weekly - 3 sets - 30 hold  Assessment   Patient tolerated treatment well. Patient tolerated intramuscular dry needling well. Overall, patient is displaying marked improvements in bilateral cervical rotation AROM. Per subjective report, patient is displaying consistent reduction in frequency of headaches. Patient tolerated progression of postural endurance training. In regards to exacerbation of chronic right sided lower back pain, patient educated upon symptom modification strategies when having to sit for prolonged periods of time as this is an aggravating activity. Patient appeared to understand this education provided.    Patient appeared to understand all education provided. Will continue to benefit from skilled intervention to address the above mentioned impairments and limitations. No adverse reactions were observed or reported from patient at the conclusion of today's session.             Physical Therapy Problem List  Chronic headaches  2.  Limitations with cervical rotation/lateral flexion and extension   Plan   Reassessment of objective measures, discussion of POC moving forward

## 2024-11-21 ENCOUNTER — APPOINTMENT (OUTPATIENT)
Dept: PHYSICAL THERAPY | Facility: CLINIC | Age: 74
End: 2024-11-21
Payer: MEDICARE

## 2024-11-21 DIAGNOSIS — R51.9 CHRONIC INTRACTABLE HEADACHE, UNSPECIFIED HEADACHE TYPE: Primary | ICD-10-CM

## 2024-11-21 DIAGNOSIS — M54.2 NECK PAIN: ICD-10-CM

## 2024-11-21 DIAGNOSIS — G89.29 CHRONIC INTRACTABLE HEADACHE, UNSPECIFIED HEADACHE TYPE: Primary | ICD-10-CM

## 2024-11-21 PROCEDURE — 97530 THERAPEUTIC ACTIVITIES: CPT | Mod: GP | Performed by: PHYSICAL THERAPIST

## 2024-11-21 PROCEDURE — 97110 THERAPEUTIC EXERCISES: CPT | Mod: GP | Performed by: PHYSICAL THERAPIST

## 2024-11-21 PROCEDURE — 97140 MANUAL THERAPY 1/> REGIONS: CPT | Mod: GP | Performed by: PHYSICAL THERAPIST

## 2024-11-27 ENCOUNTER — APPOINTMENT (OUTPATIENT)
Dept: PHYSICAL THERAPY | Facility: CLINIC | Age: 74
End: 2024-11-27
Payer: MEDICARE

## 2024-12-02 ENCOUNTER — HOSPITAL ENCOUNTER (OUTPATIENT)
Dept: RADIOLOGY | Facility: CLINIC | Age: 74
Discharge: HOME | End: 2024-12-02
Payer: MEDICARE

## 2024-12-02 ENCOUNTER — APPOINTMENT (OUTPATIENT)
Dept: PRIMARY CARE | Facility: CLINIC | Age: 74
End: 2024-12-02
Payer: MEDICARE

## 2024-12-02 VITALS
BODY MASS INDEX: 24.21 KG/M2 | SYSTOLIC BLOOD PRESSURE: 133 MMHG | DIASTOLIC BLOOD PRESSURE: 85 MMHG | TEMPERATURE: 98.3 F | WEIGHT: 136.69 LBS

## 2024-12-02 DIAGNOSIS — M54.50 LOW BACK PAIN, UNSPECIFIED BACK PAIN LATERALITY, UNSPECIFIED CHRONICITY, UNSPECIFIED WHETHER SCIATICA PRESENT: ICD-10-CM

## 2024-12-02 DIAGNOSIS — M54.50 LOW BACK PAIN, UNSPECIFIED BACK PAIN LATERALITY, UNSPECIFIED CHRONICITY, UNSPECIFIED WHETHER SCIATICA PRESENT: Primary | ICD-10-CM

## 2024-12-02 PROCEDURE — 1159F MED LIST DOCD IN RCRD: CPT | Performed by: FAMILY MEDICINE

## 2024-12-02 PROCEDURE — 1036F TOBACCO NON-USER: CPT | Performed by: FAMILY MEDICINE

## 2024-12-02 PROCEDURE — 1123F ACP DISCUSS/DSCN MKR DOCD: CPT | Performed by: FAMILY MEDICINE

## 2024-12-02 PROCEDURE — 1160F RVW MEDS BY RX/DR IN RCRD: CPT | Performed by: FAMILY MEDICINE

## 2024-12-02 PROCEDURE — 72100 X-RAY EXAM L-S SPINE 2/3 VWS: CPT

## 2024-12-02 PROCEDURE — 72100 X-RAY EXAM L-S SPINE 2/3 VWS: CPT | Performed by: RADIOLOGY

## 2024-12-02 PROCEDURE — 99213 OFFICE O/P EST LOW 20 MIN: CPT | Performed by: FAMILY MEDICINE

## 2024-12-02 RX ORDER — OXAPROZIN 600 MG/1
1200 TABLET, FILM COATED ORAL DAILY
Qty: 28 TABLET | Refills: 0 | Status: SHIPPED | OUTPATIENT
Start: 2024-12-02 | End: 2024-12-16

## 2024-12-02 RX ORDER — CYCLOBENZAPRINE HCL 10 MG
10 TABLET ORAL 2 TIMES DAILY PRN
Qty: 6 TABLET | Refills: 0 | Status: SHIPPED | OUTPATIENT
Start: 2024-12-02 | End: 2024-12-04

## 2024-12-02 NOTE — PROGRESS NOTES
Subjective   Patient ID: [id number]     Rosalba Lewis is a 74 y.o. female who presents for Back Pain (Pain in mid/left back for three weeks - no injury).    ROMAN Cleveland presents with recurence of her midline low back pain three weeks ago without obvious injury. No radiation    Review of Systems  ID-no fever  MSK-No locking, giving way/swelling of joints  NEURO- No weakness  DERM-No rashes  UROL-no incontinence  GI-no incontinence    Objective     /85 (BP Location: Right arm, Patient Position: Sitting)   Temp 36.8 °C (98.3 °F) (Oral)   Wt 62 kg (136 lb 11 oz)   BMI 24.21 kg/m²      Physical Exam  Back- without CVA or spine tenderness; straight leg raisng to 70 degrees bilaterally without pain;  has had muscle spasms during exam  Abdomen-soft, positive bowel sounds, without masses, HSmegaly or pain   Neuro- alert and & oriented times three;  CN's II through XII grossly intact; DTR's 1+ and symmetrical;  muscle strength is 5/5 bilateral upper and lower extremities    Assessment/Plan     Problem List Items Addressed This Visit       Back pain - Primary    Relevant Medications    cyclobenzaprine (Flexeril) 10 mg tablet    oxaprozin (Daypro) 600 mg tablet    Other Relevant Orders    XR lumbar spine 2-3 views     Do the back exercises provided about 30' after taking your medications. Call within the hour if you develop weakness in your legs, incontinence of stool or urine, or a fever.  If your back pain is not resolved within one week please return to be rechecked.      Prieto Patricia MD

## 2024-12-02 NOTE — PATIENT INSTRUCTIONS
Do the back exercises provided about 30' after taking your medications. Call within the hour if you develop weakness in your legs, incontinence of stool or urine, or a fever.  If your back pain is not resolved within one week please return to be rechecked.

## 2024-12-04 DIAGNOSIS — S22.080A COMPRESSION FRACTURE OF T12 VERTEBRA, INITIAL ENCOUNTER (MULTI): Primary | ICD-10-CM

## 2024-12-04 DIAGNOSIS — S32.020A COMPRESSION FRACTURE OF L2 VERTEBRA, INITIAL ENCOUNTER (MULTI): ICD-10-CM

## 2024-12-04 DIAGNOSIS — S32.030A COMPRESSION FRACTURE OF L3 LUMBAR VERTEBRA, CLOSED, INITIAL ENCOUNTER (MULTI): ICD-10-CM

## 2024-12-06 ENCOUNTER — TELEPHONE (OUTPATIENT)
Dept: PRIMARY CARE | Facility: CLINIC | Age: 74
End: 2024-12-06
Payer: MEDICARE

## 2024-12-06 DIAGNOSIS — M81.0 OSTEOPOROSIS, UNSPECIFIED OSTEOPOROSIS TYPE, UNSPECIFIED PATHOLOGICAL FRACTURE PRESENCE: ICD-10-CM

## 2024-12-06 RX ORDER — ALENDRONATE SODIUM 70 MG/1
70 TABLET ORAL
Qty: 12 TABLET | Refills: 1 | Status: SHIPPED | OUTPATIENT
Start: 2024-12-06

## 2024-12-06 NOTE — TELEPHONE ENCOUNTER
Rx Refill Request Telephone Encounter    Name:  Rosalba Lewis  :  [id number]  Medication Name:  alendronate (Fosamax) 70 mg tablet   Dose: 70 mg   Route: oral   Frequency: Every 7 days  Dispense Quantity: 12 tablet Refills: 1        Sig: Take 1 tablet (70 mg) by mouth every 7 days.    Last refill: 24    Specific Pharmacy location:  [facility] [place] [facility] [id number]   Date of last appointment:  24  Date of next appointment:  25  Best number to reach patient:  [phone]

## 2024-12-10 ENCOUNTER — APPOINTMENT (OUTPATIENT)
Dept: DERMATOLOGY | Facility: CLINIC | Age: 74
End: 2024-12-10
Payer: MEDICARE

## 2024-12-10 DIAGNOSIS — L57.0 ACTINIC KERATOSIS: ICD-10-CM

## 2024-12-10 DIAGNOSIS — L81.4 LENTIGO: ICD-10-CM

## 2024-12-10 DIAGNOSIS — Z12.83 ENCOUNTER FOR SCREENING FOR MALIGNANT NEOPLASM OF SKIN: ICD-10-CM

## 2024-12-10 DIAGNOSIS — D18.01 HEMANGIOMA OF SKIN: ICD-10-CM

## 2024-12-10 DIAGNOSIS — L57.8 PHOTOAGING OF SKIN: Primary | ICD-10-CM

## 2024-12-10 DIAGNOSIS — L82.1 SEBORRHEIC KERATOSIS: ICD-10-CM

## 2024-12-10 PROCEDURE — 1159F MED LIST DOCD IN RCRD: CPT | Performed by: DERMATOLOGY

## 2024-12-10 PROCEDURE — 17000 DESTRUCT PREMALG LESION: CPT | Performed by: DERMATOLOGY

## 2024-12-10 PROCEDURE — 99213 OFFICE O/P EST LOW 20 MIN: CPT | Performed by: DERMATOLOGY

## 2024-12-10 PROCEDURE — 1123F ACP DISCUSS/DSCN MKR DOCD: CPT | Performed by: DERMATOLOGY

## 2024-12-10 ASSESSMENT — DERMATOLOGY QUALITY OF LIFE (QOL) ASSESSMENT
RATE HOW BOTHERED YOU ARE BY SYMPTOMS OF YOUR SKIN PROBLEM (EG, ITCHING, STINGING BURNING, HURTING OR SKIN IRRITATION): 0 - NEVER BOTHERED
RATE HOW EMOTIONALLY BOTHERED YOU ARE BY YOUR SKIN PROBLEM (FOR EXAMPLE, WORRY, EMBARRASSMENT, FRUSTRATION): 0 - NEVER BOTHERED
ARE THERE EXCLUSIONS OR EXCEPTIONS FOR THE QUALITY OF LIFE ASSESSMENT: NO
RATE HOW BOTHERED YOU ARE BY EFFECTS OF YOUR SKIN PROBLEMS ON YOUR ACTIVITIES (EG, GOING OUT, ACCOMPLISHING WHAT YOU WANT, WORK ACTIVITIES OR YOUR RELATIONSHIPS WITH OTHERS): 0 - NEVER BOTHERED
WHAT SINGLE SKIN CONDITION LISTED BELOW IS THE PATIENT ANSWERING THE QUALITY-OF-LIFE ASSESSMENT QUESTIONS ABOUT: NONE OF THE ABOVE

## 2024-12-10 ASSESSMENT — PATIENT GLOBAL ASSESSMENT (PGA): PATIENT GLOBAL ASSESSMENT: PATIENT GLOBAL ASSESSMENT:  1 - CLEAR

## 2024-12-10 ASSESSMENT — DERMATOLOGY PATIENT ASSESSMENT
ARE YOU AN ORGAN TRANSPLANT RECIPIENT: NO
FOR PATIENTS COMING IN FOR A FOLLOW-UP VISIT - HAVE THERE BEEN ANY CHANGES IN YOUR HEALTH SINCE YOUR LAST VISIT: ALL IN MYCHART
ARE YOU TRYING TO GET PREGNANT: NO
DO YOU USE A TANNING BED: NO
DO YOU HAVE ANY NEW OR CHANGING LESIONS: NO
ARE YOU ON BIRTH CONTROL: NO
DO YOU USE SUNSCREEN: OCCASIONALLY

## 2024-12-10 ASSESSMENT — ITCH NUMERIC RATING SCALE: HOW SEVERE IS YOUR ITCHING?: 0

## 2024-12-10 NOTE — PROGRESS NOTES
Subjective     Rosalba Lewis is a 74 y.o. female who presents for the following: Skin Check (Pt here for yearly FBSE. No personal hx of skin cancer, Fhx of NMSC. No concerns today.).     Review of Systems:  No other skin or systemic complaints other than what is documented elsewhere in the note.    The following portions of the chart were reviewed this encounter and updated as appropriate:         Skin Cancer History  No skin cancer on file.      Specialty Problems          Dermatology Problems    Hemangioma of skin    Lentiginosis    Photoaged skin    Seborrheic keratosis        Objective   Well appearing patient in no apparent distress; mood and affect are within normal limits.    A full examination was performed including scalp, head, eyes, ears, nose, lips, neck, chest, axillae, abdomen, back, buttocks, bilateral upper extremities, bilateral lower extremities, hands, feet, fingers, toes, fingernails, and toenails. Left on bra, underwear - breasts were not examined. All findings within normal limits unless otherwise noted below.    Assessment/Plan   1. Photoaging of skin  Mottled pigmentation with telangiectasias and brown reticular macules in sun exposed areas of the body.    The risk of chronic, cumulative sun damage and risk of development of skin cancer was reviewed today.   The importance of sun protection was reviewed: including the use of a broad spectrum sunscreen that protects against both UVA/UVB rays, with ingredients such as Zinc oxide or titanium dioxide, wearing sun protective clothing and sun avoidance. We reviewed the warning signs of non-melanoma skin cancer and ABCDEs of melanoma  Please follow up should you notice any new or changing pre-existing skin lesion.    2. Actinic keratosis  Left Upper Cutaneous Lip  Erythematous macules with gritty scale.    Lesions are due to cumulative sun damage over time and are pre-cancerous. They have a risk (although small in majority of patients) of developing  into squamous cell carcinoma and therefore treatment recommendations were offered and discussed.   Treatments Discussed include LN2, photodynamic (blue light) therapy & topical chemotherapy creams, risks and benefits of each.     The risks and benefits of LN2 were reviewed including incomplete removal, crusting, blister hypo and/or hyperpigmentation, scarring and recurrence. Pt elected for LN2 today    Destr of lesion - Left Upper Cutaneous Lip  Complexity: simple    Destruction method: cryotherapy    Informed consent: discussed and consent obtained    Lesion destroyed using liquid nitrogen: Yes    Region frozen until ice ball extended beyond lesion: Yes    Cryotherapy cycles:  1  Outcome: patient tolerated procedure well with no complications    Post-procedure details: wound care instructions given      3. Seborrheic keratosis  Brown, tan waxy macules and stuck on appearing papules and plaques    The benign nature of these skin lesions reviewed, reassure provided and no further treatment needed at this time.   These lesions can be removed, if symptomatic (itching, bleeding, rubbing on clothing, painful), otherwise removal is considered cosmetic.     4. Hemangioma of skin  Cherry red papules    The benign nature of these skin lesions were reviewed, no treatment is necessary.   Please follow up for any new or pre-existing lesion that is changing in size, shape, color, becomes painful, tender, itches or bleed.    5. Lentigo  Scattered tan macules in sun-exposed areas.    These are benign skin lesions due to sun exposure. They will darken in response to sun exposure. They should be monitored for change in size, shape or color.  These lesions can be treated cosmetically with topical creams, liquid nitrogen and a variety of lasers.    6. Encounter for screening for malignant neoplasm of skin  No suspicious lesions noted on examination today    The risk of chronic, cumulative sun damage and risk of development of skin  cancer was reviewed today.   The importance of sun protection was reviewed: including the use of a broad spectrum sunscreen that protects against both UVA/UVB rays, with ingredients such as Zinc oxide or titanium dioxide, wearing sun protective clothing and sun avoidance. We reviewed the warning signs of non-melanoma skin cancer and ABCDEs of melanoma  Please follow up should you notice any new or changing pre-existing skin lesion.    Related Procedures  Follow Up In Dermatology - Established Patient        Follow up in 1 year for FBSE or sooner if needed

## 2024-12-11 ENCOUNTER — APPOINTMENT (OUTPATIENT)
Dept: PHYSICAL THERAPY | Facility: CLINIC | Age: 74
End: 2024-12-11
Payer: MEDICARE

## 2024-12-11 DIAGNOSIS — M54.2 NECK PAIN: ICD-10-CM

## 2024-12-11 DIAGNOSIS — G89.29 CHRONIC INTRACTABLE HEADACHE, UNSPECIFIED HEADACHE TYPE: Primary | ICD-10-CM

## 2024-12-11 DIAGNOSIS — R51.9 CHRONIC INTRACTABLE HEADACHE, UNSPECIFIED HEADACHE TYPE: Primary | ICD-10-CM

## 2024-12-11 PROCEDURE — 97530 THERAPEUTIC ACTIVITIES: CPT | Mod: GP,CQ

## 2024-12-11 ASSESSMENT — PAIN - FUNCTIONAL ASSESSMENT: PAIN_FUNCTIONAL_ASSESSMENT: 0-10

## 2024-12-11 NOTE — PROGRESS NOTES
"Physical Therapy Treatment    Patient Name: Rosalba Lewis  MRN: [id number]  Today's Date: 12/11/2024  Time Calculation  Start Time: 0830  Stop Time: 0855  Time Calculation (min): 25 min      Insurance  Insurance reviewed   Visit number: 6  Approved number of visits: BMN       MEDICARE BMN PT OT COPAY 0 DED 0 ,COVERAGE 96 OOP 1500(145) NO AUTH REQ,REF# [id number] // Britt confirmed 10/17/24 9:10am      Current Problem  1. Chronic intractable headache, unspecified headache type        2. Neck pain        General  Reason for Referral: Headaches and Neck pain  Referred By: Dr. Prieto Patricia    Subjective    General   Pt reports she is doing well.  States the dry needling has been \"A-mazing!\"  She reports her neck is a little stiff today.  Pt also reports she recently found out that she has compression fractures in spine (T12 & Lumbar spine).  She will be going to [facility] at the beginning of January to assess/begin treatment.    Pain  Pain Assessment  Pain Assessment: 0-10  Pain Location: Neck  Pain Orientation: Left, Right    Objective       Treatments:  Therapeutic Activity (Direct PT Contact): 15 Minutes, 1 Unit    Activities:  Intramuscular dry needling   15 minutes (performed by supervising PT)  Pt Education    Assessment:   Patient does not present with any of the following precautions/contraindications for dry needling treatment: active infection, presence of open wound, personal/family history of blood clotting disorder, active malignancy, extreme aversion to needles or previous/recent surgery in the target area. Patient provided informed consent for treatment. Patient tolerated treatment well and did not report adverse reactions. No adverse reactions were observed.  Needle Placement:  Needle #1: 15mm needle placed in mid-substance of right upper trapezius, angled 30 degrees superior from perpendicular, no needle manipulation performed, maximal superior grasp with non-needling hand maintained on upper " trapezius muscle to ensure safe environment for needle placement  Needle #2: 15mm needle placed in lateral 1/3 of right upper trapezius, angled 30 degrees superior from perpendicular, no needle manipulation performed, maximal superior grasp with non-needling hand maintained on upper trapezius muscle to ensure safe environment for needle placement  Needle #3: 15mm needle placed in mid-substance of left upper trapezius, angled 30 degrees superior from perpendicular, no needle manipulation performed, maximal superior grasp with non-needling hand maintained on upper trapezius muscle to ensure safe environment for needle placement  Needle #4: 15mm needle placed in lateral 1/3 of left upper trapezius, angled 30 degrees superior from perpendicular, no needle manipulation performed, maximal superior grasp with non-needling hand maintained on upper trapezius muscle to ensure safe environment for needle placement  All needles inserted and removed individually   Area prepped and cleaned with isopropyl alcohol pad in sterile fashion. Therapist wore appropriate PPE. Removed without adverse reaction. Patient educated regarding potential post-needling soreness and self-management strategies     Plan:   Pt has completed current POC.  Will hold 2 weeks, then return for re-check with supervising PT.    OP EDUCATION:

## 2024-12-17 DIAGNOSIS — M54.50 LOW BACK PAIN, UNSPECIFIED BACK PAIN LATERALITY, UNSPECIFIED CHRONICITY, UNSPECIFIED WHETHER SCIATICA PRESENT: ICD-10-CM

## 2024-12-17 RX ORDER — OXAPROZIN 600 MG/1
1200 TABLET, FILM COATED ORAL DAILY
Qty: 28 TABLET | Refills: 0 | Status: SHIPPED | OUTPATIENT
Start: 2024-12-17 | End: 2024-12-31

## 2024-12-24 ENCOUNTER — APPOINTMENT (OUTPATIENT)
Dept: ORTHOPEDIC SURGERY | Facility: CLINIC | Age: 74
End: 2024-12-24
Payer: MEDICARE

## 2025-01-02 ENCOUNTER — ANCILLARY PROCEDURE (OUTPATIENT)
Dept: ORTHOPEDIC SURGERY | Facility: CLINIC | Age: 75
End: 2025-01-02
Payer: MEDICARE

## 2025-01-02 ENCOUNTER — APPOINTMENT (OUTPATIENT)
Dept: ORTHOPEDIC SURGERY | Facility: CLINIC | Age: 75
End: 2025-01-02
Payer: MEDICARE

## 2025-01-02 DIAGNOSIS — M54.10 BACK PAIN WITH RADICULOPATHY: Primary | ICD-10-CM

## 2025-01-02 DIAGNOSIS — S22.000A COMPRESSION FRACTURE OF BODY OF THORACIC VERTEBRA (MULTI): ICD-10-CM

## 2025-01-02 DIAGNOSIS — M54.10 BACK PAIN WITH RADICULOPATHY: ICD-10-CM

## 2025-01-02 DIAGNOSIS — S32.000A: ICD-10-CM

## 2025-01-02 PROCEDURE — 1123F ACP DISCUSS/DSCN MKR DOCD: CPT | Performed by: PHYSICIAN ASSISTANT

## 2025-01-02 PROCEDURE — 99214 OFFICE O/P EST MOD 30 MIN: CPT | Performed by: PHYSICIAN ASSISTANT

## 2025-01-02 PROCEDURE — 1159F MED LIST DOCD IN RCRD: CPT | Performed by: PHYSICIAN ASSISTANT

## 2025-01-02 PROCEDURE — 1036F TOBACCO NON-USER: CPT | Performed by: PHYSICIAN ASSISTANT

## 2025-01-02 RX ORDER — METHYLPREDNISOLONE 4 MG/1
TABLET ORAL
Qty: 1 EACH | Refills: 0 | Status: SHIPPED | OUTPATIENT
Start: 2025-01-02

## 2025-01-02 RX ORDER — CYCLOBENZAPRINE HCL 5 MG
5 TABLET ORAL 3 TIMES DAILY PRN
Qty: 30 TABLET | Refills: 0 | Status: SHIPPED | OUTPATIENT
Start: 2025-01-02 | End: 2025-01-12

## 2025-01-02 NOTE — PROGRESS NOTES
Established patient to us here for a new issue.  This is her back.  We saw her for neck before.  This Canna started recently.  And that in the middle and low back.  No real trauma accident or fall to cause it no bowel or bladder complaints no saddle anesthesia no real radiculopathy down the legs.  No spine surgeries in the past no real treatment.  She is pretty active works out and plays pickle ball.  She had prior x-rays and we got flexion-extension views today.    We looked at patient's recent blood work.  We checked a vitamin D level which was 44 we looked at primary doctors note check the medication list see if she is on a statin to cause muscular aches and pains she is taking Lipitor.  We also looked at a bone density study that was just done on June 2023 showing a T-score of -1.9 so patient is osteopenic    Physical exam: Well-nourished, well kept.  No lymphangitis or lymphadenopathy in the examined extremities.  Good perfusion to the extremities ×4.  Radial and dorsalis pedis pulses 2+.  Capillary refill to all 4 digits brisk.  No distal edema x 4.  Patient ambulating with no difficulty full range of motion cervical spine observation no major abnormalities.  Decreased range of motion flexion-extension rotation thoracic lumbar spine good strength no instability moving upper extremities without any difficulty motor strength intact.  Moving lower extremities difficulty motor strength intact no redness, abrasions, or lesions on all 4 extremities, head and neck, or trunk.  Patient neurologically intact    X-rays: We looked at prior x-rays that were taken lumbar spine AP lateral showing degenerative disc disease.  Looks like it may be a transitional vertebrae also.  But compression fractures of L3 L2 T12 T11    Assessment: This a patient with multiple compression fractures lumbar and thoracic spine unknown if there acute or healed I have no prior x-rays to compare them to.    Plan: Later pre-CERT the patient for a  brace advised her to limit her bending and twisting and to 5 pounds to get MRI thoracic and lumbar spine to check the acuteness of this.  If these are acute she goes into a brace if her nonacute we can maybe start some physical therapy and we will also try some medication see if we calm her symptoms down

## 2025-01-03 ENCOUNTER — TREATMENT (OUTPATIENT)
Dept: PHYSICAL THERAPY | Facility: CLINIC | Age: 75
End: 2025-01-03
Payer: MEDICARE

## 2025-01-03 DIAGNOSIS — G89.29 CHRONIC INTRACTABLE HEADACHE, UNSPECIFIED HEADACHE TYPE: Primary | ICD-10-CM

## 2025-01-03 DIAGNOSIS — M54.2 NECK PAIN: ICD-10-CM

## 2025-01-03 DIAGNOSIS — R51.9 CHRONIC INTRACTABLE HEADACHE, UNSPECIFIED HEADACHE TYPE: Primary | ICD-10-CM

## 2025-01-03 PROCEDURE — 97530 THERAPEUTIC ACTIVITIES: CPT | Mod: GP | Performed by: PHYSICAL THERAPIST

## 2025-01-03 PROCEDURE — 97140 MANUAL THERAPY 1/> REGIONS: CPT | Mod: GP | Performed by: PHYSICAL THERAPIST

## 2025-01-03 NOTE — PROGRESS NOTES
Physical Therapy Treatment/Recheck    Patient Name: Rosalba Lewis  MRN: [id number]  Today's Date: 1/3/2025  Time Calculation  Start Time: 1111  Stop Time: 1144  Time Calculation (min): 33 min     PT Therapeutic Procedures Time Entry  Manual Therapy Time Entry: 10  Therapeutic Exercise Time Entry: 8  Therapeutic Activity Time Entry: 15                 Insurance reviewed   Visit number: 6  Approved number of visits: BMN       MEDICARE BMN PT OT COPAY 0 DED 0 ,COVERAGE 96 OOP 1500(145) NO AUTH REQ,REF# [id number]/ALL // Britt confirmed 10/17/24 9:10am      Current Problem  1. Chronic intractable headache, unspecified headache type        2. Neck pain                    Subjective   General  Patient reports that she had been doing better. She reports that she has noticed some soreness in to her neck recently over the past 1-2 weeks. She notices muscular soreness along both sides of her neck. She reports that she has not had headaches recently. She reports that she is being evaluated for lower back pain and lower thoracic and lumbar spine compression fractures. She has an MRI scheduled for this. In regards to her neck pain, she has noticed more on her right side but it is equal today.   Precautions  Assessment: This patient with basically arthritic degenerative neck pain.  She is also complaining of headache and I told her that they could be tension type headaches.  From the arthritic degenerative changes.     Plan: Patient is can to continue with conservative care physical therapy.  If she develops any neurological symptoms she can return she will need an MRI. -Pino Clark PA-C Note 10/30  Pain  5/10 neck bilaterally    0/10 headache    Objective     Observation:Unremarkable     Screening:     Cervical Extension Quadrant Test: negative bilaterally          Cervical AROM (* indicates pain)  Flexion-WNL  Extension-50 degrees  Lateral Flexion L 25 degrees pain ; R 35 degrees  Rotation L 70 degrees ; R 70  degrees               Accessory Mobility: 2/6 sidegliding mobility on left, C5-7, no discomfort        Palpation: Increased resting tension through bilateral posterior and lateral cervical musculature, superior shoulder musculature, more on left than right              Outcome Measures:  Other Measures  Neck Disability Index: 9  Treatments:  Reassessment of objective measures, discussion of POC moving forward, discussion of HEP refinement 15 minutes      Manual 10 minutes  STM through bilateral posterior and lateral cervical musculature  L first rib joint mobilization  L to R and R to L segmental sidegliding, grade II C3-7    Supine Cervical Traction, Retraction 2x5  Supine Cervical Traction, Retraction, extension 2x3    Seated cervical retraction, extension 1x5            OP EDUCATION/HEP:  Access Code: [email or web address]  URL: [email or web address]  Date: 11/01/2024  Prepared by: Bhaskar Lozano    Exercises  - Seated Passive Cervical Retraction  - 5 x daily - 7 x weekly - 5 reps  - Supine Chin Tuck  - 2 x daily - 7 x weekly - 10 reps    Add in repeated thoracic rotation bilaterally  Access Code: [email or web address]  URL: [email or web address]  Date: 11/15/2024  Prepared by: Bhaskar Lozano    Exercises  - Doorway Pec Stretch at 60 Degrees Abduction with Arm Straight  - 1 x daily - 7 x weekly - 3 sets - 30 hold    Access Code: [email or web address]  URL: [email or web address]  Date: 01/03/2025  Prepared by: Bhaskar Mullenette    Exercises  - Seated Cervical Retraction and Extension  - 7 x weekly - 2-3 reps (at end of sets of seated cervical retraction with overpressure)  Assessment   Patient arrived 10 minutes late to appointment secondary to weather, resulting in shortened session. Patient tolerated reassessment and treatment well. The patient has displayed improvement in bilateral cervical rotation, and extension ROM and subjective reduction in frequency and intensity of previously chronic  headaches. Patient is displaying maintenance of bilateral lateral flexion, with asymmetrical limitations of left lateral flexion.  Patient is continuing to display increased resting tension through bilateral scalene musculature and upper trapezius musculature, more so on the left compared to the right. Overall, patient is displaying marked improvement in generalized ROM capacity through the cervical spine compared to initial evaluation. Overall patient has achieved or made strong progression to all previously established goals of physical therapy.  Via shared decision making, it was decided that the patient will focus on the prescribed HEP to maintain and improve ROM/strength/functional mobility gains made in physical therapy and will contact our office with any issue moving forward.  Discussed option of patient returning for care should status of neck pain/headaches regress and to potential received intramuscular dry needling services, as she reports subjective improvement with this treatment modality.  The patient would continue to benefit from skilled intervention to address the above mentioned impairments to improve functional capacity and QOL. The patient appeared to understand all education given and displayed verbal agreement with therapy plan of care.        Goals:  Patient will display pain free and symmetrical active cervical spine flexion, extension, rotation, and sidebending ROM, indicating improved capacity for pain free functional use of head and neck MET FOR ROTATION, FLEXION AND EXTENSION, STABLE FOR FLEXION  Patient will display improvement on the NDI of at least 7, indicating improved subjective rating of function PROGRESSING  Patient will report at least 25% reduction in headache frequency and intensity MET (75%)  Patient will report at least 25% improvement in sleep quality secondary to reduction in headache intensity MET          Physical Therapy Problem List  Chronic headaches  2.  Limitations  with cervical rotation/lateral flexion and extension   Plan   Trial HEP, follow up as needed

## 2025-01-06 ENCOUNTER — TELEPHONE (OUTPATIENT)
Dept: PRIMARY CARE | Facility: CLINIC | Age: 75
End: 2025-01-06
Payer: MEDICARE

## 2025-01-06 DIAGNOSIS — E78.5 HYPERLIPIDEMIA, UNSPECIFIED HYPERLIPIDEMIA TYPE: ICD-10-CM

## 2025-01-06 RX ORDER — ATORVASTATIN CALCIUM 10 MG/1
10 TABLET, FILM COATED ORAL NIGHTLY
Qty: 90 TABLET | Refills: 0 | Status: SHIPPED | OUTPATIENT
Start: 2025-01-06 | End: 2025-04-06

## 2025-01-06 NOTE — TELEPHONE ENCOUNTER
Refill request     Med name-atorvastatin  Med dose-10mg  Med directions-take 1 tablet daily at bedtime    Pharmacy-[facility]  Pharmacy address-[place]    LR-06/19/24  LV-12/02/24  Nv-02/04/25    Thanks

## 2025-01-08 ENCOUNTER — HOSPITAL ENCOUNTER (OUTPATIENT)
Dept: RADIOLOGY | Facility: HOSPITAL | Age: 75
Discharge: HOME | End: 2025-01-08
Payer: MEDICARE

## 2025-01-08 DIAGNOSIS — M54.10 BACK PAIN WITH RADICULOPATHY: ICD-10-CM

## 2025-01-08 DIAGNOSIS — S22.000A COMPRESSION FRACTURE OF BODY OF THORACIC VERTEBRA (MULTI): ICD-10-CM

## 2025-01-08 DIAGNOSIS — S32.000A: ICD-10-CM

## 2025-01-08 PROCEDURE — 72148 MRI LUMBAR SPINE W/O DYE: CPT | Performed by: RADIOLOGY

## 2025-01-08 PROCEDURE — 72146 MRI CHEST SPINE W/O DYE: CPT

## 2025-01-08 PROCEDURE — 72146 MRI CHEST SPINE W/O DYE: CPT | Performed by: RADIOLOGY

## 2025-01-08 PROCEDURE — 72148 MRI LUMBAR SPINE W/O DYE: CPT

## 2025-01-09 ENCOUNTER — APPOINTMENT (OUTPATIENT)
Dept: ORTHOPEDIC SURGERY | Facility: CLINIC | Age: 75
End: 2025-01-09
Payer: MEDICARE

## 2025-01-10 DIAGNOSIS — I47.10 PSVT (PAROXYSMAL SUPRAVENTRICULAR TACHYCARDIA) (CMS-HCC): Primary | ICD-10-CM

## 2025-01-10 DIAGNOSIS — R00.2 PALPITATIONS: ICD-10-CM

## 2025-01-13 RX ORDER — LANOLIN ALCOHOL/MO/W.PET/CERES
1 CREAM (GRAM) TOPICAL DAILY
Qty: 90 TABLET | Refills: 3 | Status: SHIPPED | OUTPATIENT
Start: 2025-01-13

## 2025-01-15 ENCOUNTER — OFFICE VISIT (OUTPATIENT)
Dept: ORTHOPEDIC SURGERY | Facility: CLINIC | Age: 75
End: 2025-01-15
Payer: MEDICARE

## 2025-01-15 DIAGNOSIS — S22.000A COMPRESSION FRACTURE OF BODY OF THORACIC VERTEBRA (MULTI): Primary | ICD-10-CM

## 2025-01-15 PROCEDURE — 1123F ACP DISCUSS/DSCN MKR DOCD: CPT | Performed by: PHYSICIAN ASSISTANT

## 2025-01-15 PROCEDURE — 99213 OFFICE O/P EST LOW 20 MIN: CPT | Performed by: PHYSICIAN ASSISTANT

## 2025-01-15 PROCEDURE — 1159F MED LIST DOCD IN RCRD: CPT | Performed by: PHYSICIAN ASSISTANT

## 2025-01-15 PROCEDURE — 1036F TOBACCO NON-USER: CPT | Performed by: PHYSICIAN ASSISTANT

## 2025-01-15 NOTE — PROGRESS NOTES
Established patient follow-up MRI.  For compression fracture evaluation.  Patient stated the pain is not that bad but still having some discomfort.  No radiculopathy paresthesia no bowel or bladder complaints no saddle anesthesia.  Patient had a prior DEXA scan in 2023 which showed a bone density of -1.9    Physical exam: Well-nourished, well kept.  No lymphangitis or lymphadenopathy in the examined extremities.  Good perfusion to the lower extremities bilaterally.  Dorsalis pedis pulses 2+.  Capillary refill to the digits brisk.  No distal edema.  Patient ambulating with no difficulty.  Mild decreased range of motion flexion-extension rotation lumbar spine good strength no instability examination of the lower extremities reveals no point tenderness, swelling, or deformity.  Range of motion of the hips, knees, and ankles are full without crepitance, instability, or exacerbation of pain.  Strength is 5/5 throughout.  No redness, abrasions, or lesions on the lower extremities bilaterally.  Patient neurologically intact    MRI: MRI was reviewed showing acute compression fractures of T11-T12.    Assessment: This a patient with acute subacute compression fractures T11-T12.  Old compression fractures lumbar spine    Plan: We talked to the patient about wearing a brace that she really does not want to I told her she needs to limit her bending twisting and lifting.  So she is going to do that.  I am also going to order a new bone density study she will follow-up with her primary doctor with that.  She will follow-up with us as needed.  If she returns she will need lateral x-ray thoracic lumbar

## 2025-01-28 ENCOUNTER — APPOINTMENT (OUTPATIENT)
Dept: RADIOLOGY | Facility: HOSPITAL | Age: 75
End: 2025-01-28
Payer: MEDICARE

## 2025-02-03 PROBLEM — Z01.419 WELL WOMAN EXAM: Status: RESOLVED | Noted: 2023-06-05 | Resolved: 2025-02-03

## 2025-02-03 PROBLEM — D75.89 MACROCYTOSIS: Status: RESOLVED | Noted: 2023-06-02 | Resolved: 2025-02-03

## 2025-02-03 PROBLEM — D50.9 IRON DEFICIENCY ANEMIA: Status: RESOLVED | Noted: 2024-04-10 | Resolved: 2025-02-03

## 2025-02-03 PROBLEM — N39.41 URGE INCONTINENCE OF URINE: Status: RESOLVED | Noted: 2023-06-02 | Resolved: 2025-02-03

## 2025-02-03 PROBLEM — R35.0 URINARY FREQUENCY: Status: RESOLVED | Noted: 2023-06-02 | Resolved: 2025-02-03

## 2025-02-04 ENCOUNTER — APPOINTMENT (OUTPATIENT)
Dept: PRIMARY CARE | Facility: CLINIC | Age: 75
End: 2025-02-04
Payer: MEDICARE

## 2025-02-04 VITALS
HEIGHT: 62 IN | BODY MASS INDEX: 24.34 KG/M2 | SYSTOLIC BLOOD PRESSURE: 131 MMHG | DIASTOLIC BLOOD PRESSURE: 81 MMHG | WEIGHT: 132.28 LBS | TEMPERATURE: 97.9 F

## 2025-02-04 DIAGNOSIS — M54.2 NECK PAIN: ICD-10-CM

## 2025-02-04 DIAGNOSIS — M20.30: ICD-10-CM

## 2025-02-04 DIAGNOSIS — N32.81 OAB (OVERACTIVE BLADDER): ICD-10-CM

## 2025-02-04 DIAGNOSIS — Z96.652 STATUS POST LEFT KNEE REPLACEMENT: ICD-10-CM

## 2025-02-04 DIAGNOSIS — E53.8 VITAMIN B12 DEFICIENCY: ICD-10-CM

## 2025-02-04 DIAGNOSIS — S22.080S COMPRESSION FRACTURE OF T12 VERTEBRA, SEQUELA: ICD-10-CM

## 2025-02-04 DIAGNOSIS — R00.2 PALPITATIONS: ICD-10-CM

## 2025-02-04 DIAGNOSIS — M20.42 ACQUIRED HAMMERTOE OF LEFT FOOT: ICD-10-CM

## 2025-02-04 DIAGNOSIS — G89.29 CHRONIC INTRACTABLE HEADACHE, UNSPECIFIED HEADACHE TYPE: ICD-10-CM

## 2025-02-04 DIAGNOSIS — R51.9 CHRONIC INTRACTABLE HEADACHE, UNSPECIFIED HEADACHE TYPE: ICD-10-CM

## 2025-02-04 DIAGNOSIS — R10.2 PELVIC PAIN: ICD-10-CM

## 2025-02-04 DIAGNOSIS — R35.0 URINARY FREQUENCY: ICD-10-CM

## 2025-02-04 DIAGNOSIS — E78.5 HYPERLIPIDEMIA, UNSPECIFIED HYPERLIPIDEMIA TYPE: Primary | ICD-10-CM

## 2025-02-04 DIAGNOSIS — K76.89 LIVER CYST: ICD-10-CM

## 2025-02-04 DIAGNOSIS — M54.50 LOW BACK PAIN, UNSPECIFIED BACK PAIN LATERALITY, UNSPECIFIED CHRONICITY, UNSPECIFIED WHETHER SCIATICA PRESENT: ICD-10-CM

## 2025-02-04 DIAGNOSIS — S32.020S COMPRESSION FRACTURE OF L2 VERTEBRA, SEQUELA: ICD-10-CM

## 2025-02-04 DIAGNOSIS — M85.89 OSTEOPENIA OF MULTIPLE SITES: ICD-10-CM

## 2025-02-04 DIAGNOSIS — K51.919 ULCERATIVE COLITIS WITH COMPLICATION, UNSPECIFIED LOCATION (MULTI): ICD-10-CM

## 2025-02-04 DIAGNOSIS — N39.41 URGE INCONTINENCE OF URINE: ICD-10-CM

## 2025-02-04 DIAGNOSIS — K63.5 POLYP OF COLON, UNSPECIFIED PART OF COLON, UNSPECIFIED TYPE: ICD-10-CM

## 2025-02-04 DIAGNOSIS — S32.030A COMPRESSION FRACTURE OF L3 LUMBAR VERTEBRA, CLOSED, INITIAL ENCOUNTER (MULTI): ICD-10-CM

## 2025-02-04 DIAGNOSIS — K57.90 DIVERTICULOSIS: ICD-10-CM

## 2025-02-04 PROBLEM — M81.8 OTHER OSTEOPOROSIS WITHOUT CURRENT PATHOLOGICAL FRACTURE: Status: ACTIVE | Noted: 2023-06-02

## 2025-02-04 LAB
POC APPEARANCE, URINE: CLEAR
POC BILIRUBIN, URINE: NEGATIVE
POC BLOOD, URINE: NEGATIVE
POC COLOR, URINE: YELLOW
POC GLUCOSE, URINE: NEGATIVE MG/DL
POC KETONES, URINE: ABNORMAL MG/DL
POC LEUKOCYTES, URINE: ABNORMAL
POC NITRITE,URINE: NEGATIVE
POC PH, URINE: 5.5 PH
POC PROTEIN, URINE: NEGATIVE MG/DL
POC SPECIFIC GRAVITY, URINE: 1.02
POC UROBILINOGEN, URINE: 0.2 EU/DL

## 2025-02-04 PROCEDURE — 81003 URINALYSIS AUTO W/O SCOPE: CPT | Performed by: FAMILY MEDICINE

## 2025-02-04 PROCEDURE — 1160F RVW MEDS BY RX/DR IN RCRD: CPT | Performed by: FAMILY MEDICINE

## 2025-02-04 PROCEDURE — 1159F MED LIST DOCD IN RCRD: CPT | Performed by: FAMILY MEDICINE

## 2025-02-04 PROCEDURE — G0439 PPPS, SUBSEQ VISIT: HCPCS | Performed by: FAMILY MEDICINE

## 2025-02-04 PROCEDURE — G0442 ANNUAL ALCOHOL SCREEN 15 MIN: HCPCS | Performed by: FAMILY MEDICINE

## 2025-02-04 PROCEDURE — 3008F BODY MASS INDEX DOCD: CPT | Performed by: FAMILY MEDICINE

## 2025-02-04 PROCEDURE — 99214 OFFICE O/P EST MOD 30 MIN: CPT | Performed by: FAMILY MEDICINE

## 2025-02-04 PROCEDURE — 1123F ACP DISCUSS/DSCN MKR DOCD: CPT | Performed by: FAMILY MEDICINE

## 2025-02-04 PROCEDURE — 1036F TOBACCO NON-USER: CPT | Performed by: FAMILY MEDICINE

## 2025-02-04 PROCEDURE — 1170F FXNL STATUS ASSESSED: CPT | Performed by: FAMILY MEDICINE

## 2025-02-04 PROCEDURE — G0444 DEPRESSION SCREEN ANNUAL: HCPCS | Performed by: FAMILY MEDICINE

## 2025-02-04 RX ORDER — PSYLLIUM HUSK 0.4 G
1 CAPSULE ORAL DAILY
COMMUNITY
End: 2025-02-04 | Stop reason: SDUPTHER

## 2025-02-04 RX ORDER — PSYLLIUM HUSK 0.4 G
1 CAPSULE ORAL 2 TIMES DAILY
Start: 2025-02-04

## 2025-02-04 ASSESSMENT — ENCOUNTER SYMPTOMS
DEPRESSION: 0
OCCASIONAL FEELINGS OF UNSTEADINESS: 0
LOSS OF SENSATION IN FEET: 0

## 2025-02-04 ASSESSMENT — ACTIVITIES OF DAILY LIVING (ADL)
TAKING_MEDICATION: INDEPENDENT
MANAGING_FINANCES: INDEPENDENT
GROCERY_SHOPPING: INDEPENDENT
BATHING: INDEPENDENT
DOING_HOUSEWORK: INDEPENDENT
DRESSING: INDEPENDENT

## 2025-02-04 NOTE — PATIENT INSTRUCTIONS
Please provide us a copy of your Living Will and/or the Durable Power of  for Healthcare for your file.     If your back pain is not improved by APR2025 please return to be reevaluated by  your orthopedic surgeon.    Follow up fasting (no alcohol for 48 hours and just water for 14 hours) in six months for your next routine appointment.  In general, take any medications on schedule (except for types of Insulin).

## 2025-02-04 NOTE — PROGRESS NOTES
Subjective   Reason for Visit: Rosalba Lewis is an 74 y.o. female here for a Medicare Wellness visit.        In the past 2 weeks this patient has not felt down, depressed, hopeless, lost interest or pleasure in doing things or thought of harming herself or others and this was discussed over five minutes. The patient has not fallen in the last six months and has no loose rugs,  uneven floors or poor lighting at home.  Advance Care Planning discussion was held over 5 minutes.  The patient has no spiritual/Hindu/cultural values or needs that we need to know. The patient does not feel threatened or abused physically, emotionally or sexually.  The patient feels safe in the home.  In the past month, there was not a day when the patient of anyone in the patient's family went hungry because there was not enough food.  The patient denies that they or the person with them has problems with hearing, speaking, reading, moving around or learning.  The patient states they are comfortable filling out medical forms. Alcohol usage was dicussed over five minutes.    Reviewed all medications by prescribing practitioner or clinical pharmacist (such as prescriptions, OTCs, herbal therapies and supplements) and documented in the medical record.    HPI  Taking meds as directed without tolerability or affordability issues; no complaints today.    Patient Care Team:  Prieto Patricia MD as PCP - General  Prieto Patricia MD as PCP - [employer]age PCP     Review of Systems  Cardiovascular- No chest pain or pressure, nausea, diaphoresis, paresthesias, dizziness, or syncope with or without exertion  Gastrointestinal-No blood in stool, tarry stools, pain, vomiting, heartburn, constipation or diarrhea;  UC is dormant  Pulmonary-No wheezing, coughing or shortness of breath  Urology-No frequency, , blood in urine, or incontinence;  urgency is at baseline  Musculoskeletal-No locking, giving way/swelling of joints;  neck pain much  "improved after physical therapy  Neurology- No daily headaches, hx of concussion, falls in the last year or seizure  Allergy/Immunology-No history of sneezing or itching  Dermatology-No rashes, blanching or  change in any moles    Objective   Vitals:  /81 (BP Location: Right arm, Patient Position: Sitting)   Temp 36.6 °C (97.9 °F) (Oral)   Ht 1.575 m (5' 2\")   Wt 60 kg (132 lb 4.4 oz)   BMI 24.19 kg/m²       Physical Exam  Neck-supple without lymphadenopathy or thyromegaly; no carotid bruits  Heart- regular rate and rhythm, normal s1 and s2 without murmur or gallop;  no edema  Lungs-clear to auscultation  Abdomen-soft, positive bowel sounds, without masses, HSmegaly or pain     Assessment/Plan   Problem List Items Addressed This Visit       RESOLVED: Acquired hammertoe of left foot    Back pain    Colon polyps    Diverticulosis    Hyperlipidemia - Primary    Relevant Orders    Lipid Panel    Comprehensive Metabolic Panel    Thyroid Stimulating Hormone    OAB (overactive bladder)    Relevant Orders    POCT UA Automated manually resulted    Osteopenia of multiple sites    Relevant Medications    calcium carbonate-vitamin D3 500 mg-5 mcg (200 unit) tablet    Other Relevant Orders    Referral to Rheumatology    Vitamin D 25-Hydroxy,Total (for eval of Vitamin D levels)    Status post left knee replacement    Ulcerative colitis    RESOLVED: Urinary frequency    RESOLVED: Palpitations    Vitamin B12 deficiency    Relevant Orders    Vitamin B12    RESOLVED: Acquired hallux malleus    RESOLVED: Urge incontinence of urine    Pelvic pain    Liver cyst    Current Assessment & Plan     MRI [id number] Multiple simple and complex cysts throughout the liver, without  worrisome features.         RESOLVED: Chronic intractable headache    Compression fracture of T12 vertebra (Multi)    Compression fracture of L2 lumbar vertebra (Multi)    Compression fracture of L3 lumbar vertebra, closed, initial encounter (Multi)    Neck " pain       Please provide us a copy of your Living Will and/or the Durable Power of  for Healthcare for your file.     If your back pain is not improved by APR2025 please return to be reevaluated by  your orthopedic surgeon.    Follow up fasting (no alcohol for 48 hours and just water for 14 hours) in six months for your next routine appointment.  In general, take any medications on schedule (except for types of Insulin).

## 2025-02-04 NOTE — ASSESSMENT & PLAN NOTE
MRI [id number] Multiple simple and complex cysts throughout the liver, without  worrisome features.

## 2025-02-05 LAB
ALBUMIN SERPL-MCNC: 4.8 G/DL (ref 3.6–5.1)
ALP SERPL-CCNC: 46 U/L (ref 37–153)
ALT SERPL-CCNC: 22 U/L (ref 6–29)
ANION GAP SERPL CALCULATED.4IONS-SCNC: 8 MMOL/L (CALC) (ref 7–17)
AST SERPL-CCNC: 27 U/L (ref 10–35)
BILIRUB SERPL-MCNC: 0.9 MG/DL (ref 0.2–1.2)
BUN SERPL-MCNC: 10 MG/DL (ref 7–25)
CALCIUM SERPL-MCNC: 9.9 MG/DL (ref 8.6–10.4)
CHLORIDE SERPL-SCNC: 104 MMOL/L (ref 98–110)
CHOLEST SERPL-MCNC: 166 MG/DL
CHOLEST/HDLC SERPL: 2 (CALC)
CO2 SERPL-SCNC: 27 MMOL/L (ref 20–32)
CREAT SERPL-MCNC: 0.55 MG/DL (ref 0.6–1)
EGFRCR SERPLBLD CKD-EPI 2021: 96 ML/MIN/1.73M2
GLUCOSE SERPL-MCNC: 88 MG/DL (ref 65–99)
HDLC SERPL-MCNC: 85 MG/DL
LDLC SERPL CALC-MCNC: 63 MG/DL (CALC)
NONHDLC SERPL-MCNC: 81 MG/DL (CALC)
POTASSIUM SERPL-SCNC: 4.4 MMOL/L (ref 3.5–5.3)
PROT SERPL-MCNC: 7.6 G/DL (ref 6.1–8.1)
SODIUM SERPL-SCNC: 139 MMOL/L (ref 135–146)
TRIGL SERPL-MCNC: 101 MG/DL
TSH SERPL-ACNC: 1.09 MIU/L (ref 0.4–4.5)
VIT B12 SERPL-MCNC: 395 PG/ML (ref 200–1100)

## 2025-04-10 ENCOUNTER — APPOINTMENT (OUTPATIENT)
Dept: UROLOGY | Facility: CLINIC | Age: 75
End: 2025-04-10
Payer: MEDICARE

## 2025-04-10 VITALS — TEMPERATURE: 97.6 F | DIASTOLIC BLOOD PRESSURE: 78 MMHG | SYSTOLIC BLOOD PRESSURE: 146 MMHG | HEART RATE: 96 BPM

## 2025-04-10 DIAGNOSIS — N32.81 OAB (OVERACTIVE BLADDER): ICD-10-CM

## 2025-04-10 DIAGNOSIS — N39.498 OTHER SPECIFIED URINARY INCONTINENCE: ICD-10-CM

## 2025-04-10 LAB
POC APPEARANCE, URINE: CLEAR
POC BILIRUBIN, URINE: NEGATIVE
POC BLOOD, URINE: ABNORMAL
POC COLOR, URINE: YELLOW
POC GLUCOSE, URINE: NEGATIVE MG/DL
POC KETONES, URINE: NEGATIVE MG/DL
POC LEUKOCYTES, URINE: ABNORMAL
POC NITRITE,URINE: NEGATIVE
POC PH, URINE: 6 PH
POC PROTEIN, URINE: NEGATIVE MG/DL
POC SPECIFIC GRAVITY, URINE: >=1.03
POC UROBILINOGEN, URINE: 0.2 EU/DL

## 2025-04-10 PROCEDURE — 1123F ACP DISCUSS/DSCN MKR DOCD: CPT | Performed by: UROLOGY

## 2025-04-10 PROCEDURE — 1159F MED LIST DOCD IN RCRD: CPT | Performed by: UROLOGY

## 2025-04-10 PROCEDURE — 99214 OFFICE O/P EST MOD 30 MIN: CPT | Performed by: UROLOGY

## 2025-04-10 PROCEDURE — 81003 URINALYSIS AUTO W/O SCOPE: CPT | Performed by: UROLOGY

## 2025-04-10 PROCEDURE — G2211 COMPLEX E/M VISIT ADD ON: HCPCS | Performed by: UROLOGY

## 2025-04-10 RX ORDER — MIRABEGRON 50 MG/1
50 TABLET, FILM COATED, EXTENDED RELEASE ORAL DAILY
Qty: 30 TABLET | Refills: 3 | Status: SHIPPED | OUTPATIENT
Start: 2025-04-10 | End: 2025-04-11

## 2025-04-10 ASSESSMENT — RHEUMATOLOGY NEW PATIENT QUESTIONNAIRE
JOINT PAIN: Y
SWOLLEN LEGS OR FEET: N
DIFFICULTY FALLING ASLEEP: N
BLOOD IN STOOLS: N
NAUSEA: N
PERSISTENT DIARRHEA: N
UNUSUAL BLEEDING: N
UNEXPLAINED WEIGHT CHANGE: N
HOW WOULD YOU DESCRIBE YOUR STIFFNESS ON AVERAGE: MODERATE
HEARTBURN OR REFLUX: N
EASY BRUISING: N
MUSCLE WEAKNESS: N
EASILY LOSING TEMPER: N
EYE PAIN: N
MORNING STIFFNESS: Y
JOINT SWELLING: N
LOSS OF CONSCIOUSNESS: N
ABNORMAL URINE: N
CHEST PAIN: N
BLACK STOOLS: N
DIFFICULTY BREATHING LYING DOWN: N
BEHAVIORAL CHANGES: N
INCREASED SUSCEPTIBILITY TO INFECTION: N
NODULES/BUMPS: N
DOUBLE OR BLURRED VISION: N
DIFFICULTY SWALLOWING: N
HEADACHES: Y
FEVER: N
SKIN REDNESS: N
RASH: N
ANEMIA: N
VAGINAL DRYNESS: N
UNUSUAL FATIGUE: N
STOMACH PAIN: N
AGITATION: N
ANXIETY: N
SEIZURES: N
DRYNESS OF MOUTH: N
SHORTNESS OF BREATH: N
RASH OR ULCERS: N
JAUNDICE: N
EYE REDNESS: N
SORES IN MOUTH OR NOSE: N
COUGH: N
NUMBNESS OR TINGLING IN HANDS OR FEET: N
SUN SENSITIVE (SUN ALLERGY): N
MORNING STIFFNESS IN LOWER BACK: Y
UNUSUALLY RAPID OR SLOWED HEART RATE: N
MEMORY LOSS: N
SKIN TIGHTNESS: N
EXCESSIVE HAIR LOSS (MORE THAN YOUR NORM): N
DEPRESSION: N
COLOR CHANGES OF HANDS OR FEET IN THE COLD: N
HOARSE VOICE: N
LOSS OF VISION: N
NIGHT SWEATS: N
VOMITING OF BLOOD OR COFFEE GROUND CONSISTENCY MATERIAL: N
PAIN OR BURNING ON URINATION: N
EYE DRYNESS: N
UNEXPLAINED HEARING LOSS: N
DIFFICULTY STAYING ASLEEP: Y
SWOLLEN OR TENDER GLANDS: N
FAINTING: N

## 2025-04-10 NOTE — PROGRESS NOTES
Subjective   Patient ID: Rosalba Lewis is a 74 y.o. female HPI  74 year old female presents for MOTIVATION study follow up. Last seen in Office [4/28/2022]          1. UUI/OAB previously on trospium     1a. Now s/p MOTIATION study  2. POP-Q 8/12/21 :Aa: -2.5. Ba: -2.5 C: -5 Gh: 3. Pb: 3 TVL: 7 Ap: -2.5. Bp: -2.5. D: -6  3. Ulcerative colitis  4. 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 Osteoporosis    Patient is endorsing worsening OAB complaints. Patient reports DTFx 5-6 and NTF x3-4. She uses 4 pads during the day which are damps vs soaked at times.   She denies any vaginal complaints, no abnormal bleeding or discharge.     She has no other complaints.      From Previous note 4/28/22  Last seen by Viri Galindo, CNP 2/15/22. 71 year old female presents for MOTIVATION study follow up. She is very happy with her experience on the MOTIVATION study and reports ~80% improvement in OAB symptoms. Patient continues to practice the exercises and behavioral modifications that she learned during the study. She currently endorses daytime frequency every 3.5 hours and NTF x1. She is no longer wearing pads. She was also very happy with the chat bot from the study. She takes Alendronate 70 mg weekly, Apriso 0.375 gm ER, and Atorvastatin 10 mg QHS. NKDA. Denies hematuria, dysuria, flank pain, and bothersome frequency or urgency. Patient is a former smoker.       Review of Systems  PHYSICAL EXAMINATION:  No LMP recorded. Patient is postmenopausal.  There is no height or weight on file to calculate BMI.  Visit Vitals  /78   Pulse 96   Temp 36.4 °C (97.6 °F)   OB Status Postmenopausal   Smoking Status Former     General Appearance: well appearing  Neuro: Alert and oriented       PVR (by Ultrasound): 53   Urine dip:   Recent Results (from the past 6 hours)   POCT UA Automated manually resulted    Collection Time: 04/10/25  9:42 AM   Result Value Ref Range    POC Color, Urine Yellow Straw, Yellow, Light-Yellow    POC Appearance,  Urine Clear Clear    POC Glucose, Urine NEGATIVE NEGATIVE mg/dl    POC Bilirubin, Urine NEGATIVE NEGATIVE    POC Ketones, Urine NEGATIVE NEGATIVE mg/dl    POC Specific Gravity, Urine >=1.030 1.005 - 1.035    POC Blood, Urine TRACE-Intact (A) NEGATIVE    POC PH, Urine 6.0 No Reference Range Established PH    POC Protein, Urine NEGATIVE NEGATIVE mg/dl    POC Urobilinogen, Urine 0.2 0.2, 1.0 EU/DL    Poc Nitrite, Urine NEGATIVE NEGATIVE    POC Leukocytes, Urine SMALL (1+) (A) NEGATIVE       Imaging and results:      Assessment/Plan   71 year old female presents for MOTIVATION study follow up.         1. UUI/OAB previously on trospium     1a. Now s/p MOTIATION study  2. POP-Q 8/12/21 :Aa: -2.5. Ba: -2.5 C: -5 Gh: 3. Pb: 3 TVL: 7 Ap: -2.5. Bp: -2.5. D: -6  3. Ulcerative colitis  4. 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 Osteoporosis     Patient is endorsing worsening OAB. We will start her on Myrbetriq 50 mg. Side effects discussed.d    Fu in 4-6 weeks with NP.      [employer] Attestation  By signing my name below, I, Bria Gomez attest that this documentation has been prepared under the direction and in the presence of Bismark Peña MD. All medical record entries made by the Scribe were at my direction or personally dictated by me. I have reviewed the chart and agree that the record accurately reflects my personal performance of the history, physical exam, discussion and plan.

## 2025-04-11 DIAGNOSIS — N32.81 OAB (OVERACTIVE BLADDER): ICD-10-CM

## 2025-04-15 ENCOUNTER — APPOINTMENT (OUTPATIENT)
Dept: RHEUMATOLOGY | Facility: CLINIC | Age: 75
End: 2025-04-15
Payer: MEDICARE

## 2025-04-15 VITALS
SYSTOLIC BLOOD PRESSURE: 124 MMHG | BODY MASS INDEX: 23.39 KG/M2 | HEIGHT: 63 IN | DIASTOLIC BLOOD PRESSURE: 79 MMHG | WEIGHT: 132 LBS | HEART RATE: 82 BPM

## 2025-04-15 DIAGNOSIS — M80.00XA AGE-RELATED OSTEOPOROSIS WITH CURRENT PATHOLOGICAL FRACTURE, INITIAL ENCOUNTER: Primary | ICD-10-CM

## 2025-04-15 DIAGNOSIS — M85.89 OSTEOPENIA OF MULTIPLE SITES: ICD-10-CM

## 2025-04-15 PROCEDURE — 99204 OFFICE O/P NEW MOD 45 MIN: CPT | Performed by: INTERNAL MEDICINE

## 2025-04-15 PROCEDURE — G2211 COMPLEX E/M VISIT ADD ON: HCPCS | Performed by: INTERNAL MEDICINE

## 2025-04-15 PROCEDURE — 1123F ACP DISCUSS/DSCN MKR DOCD: CPT | Performed by: INTERNAL MEDICINE

## 2025-04-15 PROCEDURE — 1036F TOBACCO NON-USER: CPT | Performed by: INTERNAL MEDICINE

## 2025-04-15 PROCEDURE — 1160F RVW MEDS BY RX/DR IN RCRD: CPT | Performed by: INTERNAL MEDICINE

## 2025-04-15 PROCEDURE — 3008F BODY MASS INDEX DOCD: CPT | Performed by: INTERNAL MEDICINE

## 2025-04-15 PROCEDURE — 1159F MED LIST DOCD IN RCRD: CPT | Performed by: INTERNAL MEDICINE

## 2025-04-15 NOTE — PROGRESS NOTES
Subjective   Patient ID: [id number]   Rosalba Lewis is a 74 y.o. female who presents for Arthritis and Joint Pain.  HPI    Here for opinion on Osteopenia  Has been taking Fosamax for approx 10 years  On Fosamax from 2015 onwards based on  osteopenia with prior hx of compression fractures  Adherent to meds  Recently had acute back pain and imaging revealed multilievel compression fractures , acute in T12 as detailed below      History of falls / fractures: yes  Loss of height: yes 2 inches  Tobacco: no  Alcohol: no  Vitamin D supplementation: yes  Calcium supplementation: yes  Weight bearing exercise: yes  Previous or planned invasive jaw surgery: no  History of radiation: no  Chronic steroid use: no  History of RA: no  GERD: no  Ariel-en-y: no  CKD / GFR <30: no  Parental hip fracture: no     MRI thorax and lumbar spine  1/2025  Acute compression fractures at the superior endplate of T12 and  inferior endplate of T11 anteriorly.      Chronic appearing mild compression deformity at the superior endplate  of T5.      Multilevel discogenic degenerative changes of the thoracic spine,  more pronounced at the lower thoracic levels. There is bulging disc  and marginal osteophyte at T11-12 and T12-L1 with mass effect upon  the ventral subarachnoid space though no deformity of the spinal  cord. Mild bulging disc is identified at multiple additional levels  from T6 through T12.        Multilevel discogenic degenerative changes of the lumbar spine with  dextroscoliosis. There is multilevel hypertrophic facet arthrosis  with degenerative subluxations as detailed above.      There is multilevel bulging disc with moderate central canal  narrowing at L2-3 and L3-4. There is bilateral multilevel neural  foraminal narrowing as detailed above.      Chronic appearing moderate compression deformity of the L2 vertebral  body and chronic appearing mild-to-moderate compression deformity at  the superior endplate of  L3      DEXA  2023  FINDINGS:   SPINE L1-L4   Bone Mineral Density: 1.261   T-Score 0.7  Z-Score 2.4   Classification:  Not reported   Bone Mineral Density change vs baseline:  Not reported   Bone Mineral Density change vs previous: 4.0%      LEFT FEMUR -TOTAL   Bone Mineral Density: 0.769   T-Score -1.9   Z-Score  -0.3   Classification:  Not reported   Bone Mineral Density change vs baseline: Not reported   Bone Mineral Density change vs previous: 1.2%      LEFT FEMUR -NECK   Bone Mineral Density: 0.821   T-Score -1.6  Z-Score 0.3   Classification:  Not reported       2021      Left Femur Neck :     . Bone Density: 0.786 g/cm2 . . T Score: -1.8 . . Z Score: 0.0 . .   WHO Classification: Osteopenia . . % Change: 2.7%      Left Femur Total :     . Bone Density: 0.760 g/cm2 . . T Score: -2.0 . . Z Score: -0.4 . .   WHO Classification: Osteopenia . . % Change: 3.8% *      AP Spine L1-L4 :    . Bone Density: 1.212 g/cm2 . . T Score: 0.3 . . Z Score: 2.1 . .   WHO Classification: Normal . . % Change: 13.6% *       2019    Left Femur Neck :    . Bone Density: 0.765 g/cm2 . . T Score: -2.0 . . Z Score: -0.2 . .   WHO Classification: Osteopenia . . % Change: -8.7 % *     Left Femur Total :    . Bone Density: 0.732 g/cm2 . . T Score: -2.2 . . Z Score: -0.7 . .   WHO Classification: Osteopenia . . % Change: -3.9 % *     AP Spine L1-L4 :   . Bone Density: 1.067 g/cm2 . . T Score: -0.9 . . Z Score: 0.9 . .   WHO Classification: Normal . . % Change: 2.3 % *      2015  Left Femur Neck :    . Bone Density: 0.837 g/cm2 . . T Score: -1.4 . . Z Score: 0.1 . .   WHO Classification: Osteopenia . . % Change: -4.3 % *     Left Femur Total :    . Bone Density: 0.762 g/cm2 . . T Score: -2.0 . . Z Score: -0.6 . .   WHO Classification: Osteopenia . . % Change: -7.3 % *     AP Spine L1-L4 :   . Bone Density: 1.043 g/cm2 . . T Score: -1.1 . . Z Score: 0.6 . .   WHO Classification: Osteopenia . . % Change: 2.3 % *      Patient Active Problem  List   Diagnosis    Back pain    Colon polyps    Diverticulosis    HGSIL on Pap smear of cervix    Hyperlipidemia    OAB (overactive bladder)    Osteopenia of multiple sites    Status post left knee replacement    Ulcerative colitis    Postmenopausal    Vitamin B12 deficiency    Hemangioma of skin    Lentiginosis    Osteochondropathy    Photoaged skin    Seborrheic keratosis    Pelvic pain    Liver cyst    Encounter for immunization    Encounter for screening mammogram for malignant neoplasm of breast    Health care maintenance    Compression fracture of T12 vertebra (Multi)    Compression fracture of L2 lumbar vertebra (Multi)    Compression fracture of L3 lumbar vertebra, closed, initial encounter (Multi)    Neck pain        Past Medical History:   Diagnosis Date    Acute upper respiratory infection, unspecified 03/14/2019    Acute URI    Body mass index (BMI) 27.0-27.9, adult 06/24/2021    BMI 27.0-27.9,adult    Conjunctival hemorrhage, right eye 06/22/2017    Subconjunctival hemorrhage of right eye    Cough, unspecified 12/15/2014    Cough    Dislocation of other parts of thorax, sequela 02/10/2020     rib, sequela    Elevated blood-pressure reading, without diagnosis of hypertension 06/14/2017    Blood pressure elevated without history of HTN    Encounter for other preprocedural examination 11/18/2021    Preoperative evaluation to rule out surgical contraindication    Encounter for other preprocedural examination 11/29/2021    Preoperative clearance    Encounter for other preprocedural examination 05/01/2018    Preoperative clearance    Erythematous condition, unspecified 08/24/2018    Erythema of toe    Noninfective gastroenteritis and colitis, unspecified 12/15/2014    Colitis    Other conditions influencing health status 12/11/2013    History of cough    Other specified health status 09/10/2015    History of dog bite    Other specified postprocedural states     History of Papanicolaou smear    Other  specified soft tissue disorders 08/24/2018    Swelling of toe of right foot    Pain in left toe(s) 09/05/2017    Pain of toe of left foot    Pain in right toe(s) 08/24/2018    Pain of right great toe    Pain in unspecified toe(s) 08/24/2018    Pain in toe    Personal history of diseases of the blood and blood-forming organs and certain disorders involving the immune mechanism 05/06/2019    History of iron deficiency anemia    Personal history of other diseases of the nervous system and sense organs     History of blurred vision    Personal history of other diseases of the respiratory system 12/15/2014    History of sinusitis    Personal history of other diseases of the respiratory system     History of acute pharyngitis    Personal history of other endocrine, nutritional and metabolic disease 09/09/2015    History of obesity    Personal history of other medical treatment 07/27/2017    History of screening mammography    Personal history of other mental and behavioral disorders 12/12/2022    History of depression    Personal history of other specified conditions 08/02/2021    History of fatigue    Personal history of other specified conditions 10/14/2015    History of diarrhea    Personal history of other specified conditions     History of dysuria    Personal history of pneumonia (recurrent) 02/01/2016    History of pneumonia    Pleurodynia 07/10/2018    Rib pain on left side    Right knee pain 06/02/2023    Status post left knee replacement 06/02/2023    Tinnitus, bilateral 09/05/2017    Tinnitus, bilateral    Unspecified asthma, uncomplicated ([facility]) 01/15/2019    AB (asthmatic bronchitis)    Unspecified asthma, uncomplicated ([facility]) 05/23/2022    AB (asthmatic bronchitis)    Urinary tract infection, site not specified 01/28/2017    Acute lower UTI        Past Surgical History:   Procedure Laterality Date    CERVICAL BIOPSY  W/ LOOP ELECTRODE EXCISION  02/04/2016    Cervical Loop Electrosurgical Excision  (LEEP)    COLPOSCOPY      TOTAL KNEE ARTHROPLASTY Left 2018        Social History     Socioeconomic History    Marital status:      Spouse name: Not on file    Number of children: Not on file    Years of education: Not on file    Highest education level: Not on file   Occupational History    Not on file   Tobacco Use    Smoking status: Former     Current packs/day: 0.00     Types: Cigarettes     Quit date:      Years since quittin.3    Smokeless tobacco: Never   Vaping Use    Vaping status: Never Used   Substance and Sexual Activity    Alcohol use: Not Currently     Comment: quit drinking [id number]    Drug use: Never    Sexual activity: Not on file   Other Topics Concern    Not on file   Social History Narrative    Not on file     Social Drivers of Health     Financial Resource Strain: Not on file   Food Insecurity: Not on file   Transportation Needs: Not on file   Physical Activity: Not on file   Stress: Not on file   Social Connections: Not on file   Intimate Partner Violence: Not on file   Housing Stability: Not on file        Allergies   Allergen Reactions    Clindamycin Unknown          Current Outpatient Medications:     alendronate (Fosamax) 70 mg tablet, Take 1 tablet (70 mg) by mouth every 7 days. Take in the morning with a full glass of water, on an empty stomach, and do not take anything else by mouth or lie down for the next 30 min., Disp: 12 tablet, Rfl: 1    atorvastatin (Lipitor) 10 mg tablet, Take 1 tablet (10 mg) by mouth once daily at bedtime., Disp: 90 tablet, Rfl: 0    calcium carbonate-vitamin D3 500 mg-5 mcg (200 unit) tablet, Take 1 tablet by mouth 2 times a day., Disp: , Rfl:     coenzyme Q-10 100 mg capsule, Take 2 capsules (200 mg) by mouth once daily., Disp: , Rfl:     cyanocobalamin (Vitamin B-12) 100 mcg tablet, Take 1 tablet (100 mcg) by mouth once daily., Disp: 90 tablet, Rfl: 1    ferrous sulfate 325 (65 Fe) MG tablet, Take 1 tablet (325 mg) by mouth once daily.,  Disp: , Rfl:     magnesium oxide (Mag-Ox) 400 mg (241.3 mg magnesium) tablet, Take 1 tablet (400 mg) by mouth once daily., Disp: 90 tablet, Rfl: 3    mesalamine ER (Apriso) 0.375 gram 24 hr capsule, Take 3 capsules (1.125 g) by mouth once daily., Disp: , Rfl:     methylPREDNISolone (Medrol Dospak) 4 mg tablets, Follow schedule on package instructions, Disp: 1 each, Rfl: 0    multivitamin tablet, Take by mouth., Disp: , Rfl:     vibegron 75 mg tablet, Take 1 tablet (75 mg) by mouth once daily., Disp: 30 tablet, Rfl: 11       Objective     Visit Vitals  /79   Pulse 82        Physical Exam  OA changes in the hands with large susannah's nodes    Head: normocephalic, atraumatic  Eyes: EOMI, conjunctiva clear, sclera white, anicteric  Ears: no redness, swelling, tenderness  Nose: no deformity, no crusting   Throat/Mouth: No oral deformities, no cheek swelling, mucosa appear moist, no oral ulcers noted or loss of dentition   Neck/Lymph: FROM, trachea midline  Lungs: chest expansion symmetric. No respiratory distress.   Heart: RRR  Neuro: CN II-XII grossly intact, no focal deficit  Skin: No rashes, ulcers or photosensitive areas  MSK: Upper Extremities:  Hand/Fingers: No erythema, swelling, tenderness or warmth at DIP, PIP, or MCP joints, FROM grossly. Good hand . No nodules. No deformities   Wrists: No erythema, swelling, warmth or tenderness at wrist, FROM grossly  Elbows: No tenderness, swelling, erythema or warmth at elbows, FROM grossly. No nodules   Shoulders: No swelling, erythema, tenderness or warmth at shoulders. FROM  Lower Extremities:   Hips: No obvious deformities. No joint tenderness, normal ROM grossly. Log roll test negative bilaterally. Lolita test is negative bilaterally. No trochanteric bursae TTP  Knees: No tenderness, deformities, swelling, rashes, or warmth, normal ROM grossly. No crepitus, no pes anserine bursa TTP   Ankles: No deformities, tenderness, edema, erythema, ulceration, or warmth  "at the ankle  Feet: Negative MTP squeeze. Normal ROM grossly.   Spine: No spinal tenderness to palpation. No SI joint tenderness. Gaenslen test negative       [unfilled]      Lab Results   Component Value Date    WBC 6.9 04/23/2024    HGB 13.0 04/23/2024    HCT 38.0 04/23/2024    MCV 94 04/23/2024     04/23/2024        Chemistry    Lab Results   Component Value Date/Time     02/04/2025 1526    K 4.4 02/04/2025 1526     02/04/2025 1526    CO2 27 02/04/2025 1526    BUN 10 02/04/2025 1526    CREATININE 0.55 (L) 02/04/2025 1526    Lab Results   Component Value Date/Time    CALCIUM 9.9 02/04/2025 1526    ALKPHOS 46 02/04/2025 1526    AST 27 02/04/2025 1526    ALT 22 02/04/2025 1526    BILITOT 0.9 02/04/2025 1526           No results found for: \"CRP\"   Lab Results   Component Value Date    KARON Negative 10/04/2024    SEDRATE 3 10/04/2024      No results found for: \"CKTOTAL\"  Lab Results   Component Value Date    NEUTROABS 2.37 05/06/2019      Lab Results   Component Value Date    FERRITIN 250 (H) 05/06/2019      Lab Results   Component Value Date    HEPCAB NONREACTIVE 12/12/2022      Lab Results   Component Value Date    ALT 22 02/04/2025    AST 27 02/04/2025    ALKPHOS 46 02/04/2025    BILITOT 0.9 02/04/2025      No results found for: \"PPD\"   Lab Results   Component Value Date    URICACID 5.1 08/24/2018      Lab Results   Component Value Date    CALCIUM 9.9 02/04/2025      No results found for: \"SPEP\", \"UPEP\"   No results found for: \"ALBUR\", \"[id number]\"   .last          MR thoracic spine wo IV contrast  Narrative: Interpreted By:  Lowell Crocker,   STUDY:  MR THORACIC SPINE WO IV CONTRAST; ;  1/8/2025 9:32 am      INDICATION:  Signs/Symptoms:compression fracture.      COMPARISON:  None.      ACCESSION NUMBER(S):  [id number]      ORDERING CLINICIAN:  JOSEPHINE PULLIAM      TECHNIQUE:  Multiplanar and multisequential MR images of the thoracic spine are  performed.      FINDINGS:  There is mild to " moderate compression deformity of the T12 vertebral  body, primarily at the superior endplate. There is heterogenous bone  marrow edema throughout the vertebra with some extension into both  pedicles. There is compression deformity at the inferior endplate of  T11 anteriorly with contour irregularity. There is subcortical  irregular curvilinear low signal. There is heterogenous bone marrow  edema throughout the vertebral body. There is focal bone marrow edema  at the posterior tip of the T11 spinous process on sagittal images. A  fracture line is not visualized.      There is mild compression deformity at the superior endplate of T5  without acute bone marrow edema.      The remaining thoracic vertebral heights are maintained.      There are mild multilevel discogenic degenerative changes of the  thoracic spine which are more pronounced in the lower thoracic levels  with disc desiccation and mild disc space narrowing. There is  multilevel lower cervical spondylosis at C5-6 and C6-7. There is left  paracentral disc protrusion at T5-6 which contacts and effaces the  anterior surface of the spinal cord to the left of midline. There is  no intramedullary edema. There is multilevel bulging disc and  marginal osteophyte in the lower thoracic spine at T11-12 and T12-L1  with mass effect upon the ventral subarachnoid space. There is no  deformity the spinal cord. Bulging distal lesser degree is identified  at multiple additional disc space levels from T6 through T12. There  is multilevel hypertrophic costovertebral arthrosis also more  pronounced at the lower thoracic levels.      The thoracic spinal cord is of normal and uniform caliber. There is  no intramedullary mass or cord expansion.      There is accentuated kyphosis of the lower thoracic spine centered at  the T11-12 disc space. There is mild dextroconvexity of the thoracic  spine.          Impression: Acute compression fractures at the superior endplate of T12  and  inferior endplate of T11 anteriorly.      Chronic appearing mild compression deformity at the superior endplate  of T5.      Multilevel discogenic degenerative changes of the thoracic spine,  more pronounced at the lower thoracic levels. There is bulging disc  and marginal osteophyte at T11-12 and T12-L1 with mass effect upon  the ventral subarachnoid space though no deformity of the spinal  cord. Mild bulging disc is identified at multiple additional levels  from T6 through T12.      Left paracentral disc protrusion at T5-6 with effacement of the  anterior surface of the spinal cord to the left of midline. There is  no cord edema.          MACRO:  None      Signed by: Lowell Crocker 1/8/2025 12:05 PM  Dictation workstation:   [id number]  MR lumbar spine wo IV contrast  Narrative: Interpreted By:  Lowell Crocker,   STUDY:  MR LUMBAR SPINE WO IV CONTRAST; ;  1/8/2025 9:31 am      INDICATION:  Signs/Symptoms:back pain.      COMPARISON:  None.      ACCESSION NUMBER(S):  [id number]      ORDERING CLINICIAN:  JOSEPHINE PULLIAM      TECHNIQUE:  Multiplanar and multisequential MR images of the lumbar spine are  performed.      The thoracic spine findings will be described on dedicated thoracic  spine MRI performed the same day. Please refer to this report.      For the purposes of communication I will refer to 5 lumbar type  vertebral bodies with the 1st non-rib vertebra labeled L1 and the 1st  sacralized vertebra labeled S1.      FINDINGS:  There is dextroscoliosis of the lumbar spine. There is minimal  anterior subluxation of L5 measuring 2 mm. There is posterior  subluxation of L3 measuring 4 mm and L2 measuring 6 mm.      There are multilevel discogenic degenerative changes lumbar spine  with disc desiccation and disc space narrowing. There is moderate  compression deformity of the L2 vertebral body with concavity of the  superior and inferior endplates and some retropulsion of the  posterior vertebral  cortex. There is mild to moderate compression  deformity at the superior endplate of L3. There is no bone marrow  edema at the sites. The remaining lumbar vertebral body heights are  maintained.      The conus medullaris is of normal morphology and signal. The tip of  the conus descends to the L2 level.      Axial images are performed through the lumbar disc spaces from the  mid L1 level through S1.      The L1-2 disc space level demonstrates mild bilateral facet arthrosis  and ligamentum flavum hypertrophy, greater on the left. There is  circumferential bulging disc asymmetric to the left with mass effect  upon the anterior thecal sac and mild to moderate central canal  narrowing. There is disc encroachment with moderate narrowing at the  caudal neural foramen, greater on the left.      The L2-3 disc space level demonstrates moderate to severe bilateral  facet arthrosis and ligamentum flavum hypertrophy. There is  circumferential bulging disc contributing to moderate central canal  narrowing and crowding of the intrathecal nerve roots. There is disc  and osteophyte encroachment with moderate bilateral neural foraminal  narrowing.      The L3-4 disc space level demonstrates moderate bilateral facet  arthrosis and ligamentum flavum hypertrophy. There is mild  circumferential bulging disc with mass effect upon the anterior  thecal sac and moderate central canal narrowing. There is disc and  encroachment with moderate bilateral neural foraminal narrowing.      The L4-5 disc space level demonstrates moderate to severe bilateral  facet arthrosis and ligamentum flavum hypertrophy, greater on the  left. There is mild bulging disc with flattening of the anterior  thecal sac and mild to moderate central canal narrowing. There is  disc encroachment with moderate narrowing of the left neural foramen  and mild narrowing of the right neural foramen.      The L5-S1 disc space level demonstrates severe bilateral facet  arthrosis  greater on the right and moderate ligamentum flavum  hypertrophy. There is mild bulging disc with flattening of the  anterior thecal sac and mild central canal narrowing. There is mild  disc encroachment and narrowing at the right neural foramen.      The S1-2 disc space level demonstrates mild bilateral facet  arthrosis. There is no significant central canal or neural foraminal  stenosis.      Multilevel Tarlov cyst formation is identified at S3 on sagittal  images with some bony remodeling. There is no bone marrow edema at  this site.      The surrounding soft tissues are unremarkable.      Impression: Multilevel discogenic degenerative changes of the lumbar spine with  dextroscoliosis. There is multilevel hypertrophic facet arthrosis  with degenerative subluxations as detailed above.      There is multilevel bulging disc with moderate central canal  narrowing at L2-3 and L3-4. There is bilateral multilevel neural  foraminal narrowing as detailed above.      Chronic appearing moderate compression deformity of the L2 vertebral  body and chronic appearing mild-to-moderate compression deformity at  the superior endplate of L3.          MACRO:  None      Signed by: Lowell Crocker 1/8/2025 10:53 AM  Dictation workstation:   [id number]     === 01/02/25 ===    XR LUMBAR SPINE 2-3 VIEWS   === 01/08/25 ===    MR THORACIC SPINE WO CONTRAST    - Impression -  Acute compression fractures at the superior endplate of T12 and  inferior endplate of T11 anteriorly.    Chronic appearing mild compression deformity at the superior endplate  of T5.    Multilevel discogenic degenerative changes of the thoracic spine,  more pronounced at the lower thoracic levels. There is bulging disc  and marginal osteophyte at T11-12 and T12-L1 with mass effect upon  the ventral subarachnoid space though no deformity of the spinal  cord. Mild bulging disc is identified at multiple additional levels  from T6 through T12.    Left paracentral  disc protrusion at T5-6 with effacement of the  anterior surface of the spinal cord to the left of midline. There is  no cord edema.      MACRO:  None    Signed by: Lowell Crocker 1/8/2025 12:05 PM  Dictation workstation:   [id number]   === 06/05/23 ===    DEXA BONE DENSITY    - Impression -  DEXA:  According to World Health Organization criteria,  classification is  low bone mass (osteopenia)    Followup recommended in two years or sooner as clinically warranted.    All images and detailed analysis are available on the  Radiology  PACS.    MACRO:  None       Assessment/Plan   Diagnoses and all orders for this visit:  High risk Osteopenia with recurrent pathological fracture, initial encounter  Osteopenia with low to moderate frax in the past   Has recurrent vertebral fractures  And recent T12 fracture despite being on alendronate  Risks and benefits of switching to injectable forms discussed with the patient  Given her CrCL is 36 ( borderline)  Would favor Prolia instead of Reclast  Other work up including CTX as outliend below  Risk of BONJ and AFF discussed with the patient  No dental procedures planned, discussed that invasive dental procedures will have to be timed around her subsequent dose of Prolia if need be.    Taking Ca and Vit D     -     Parathyroid Hormone, Intact; Future  -     C-Telopeptide, Beta Cross Linked; Future  -     Serum Protein Electrophoresis; Future  -     Follow Up In Rheumatology; Future  -     Parathyroid Hormone, Intact; Future  -     C-Telopeptide, Beta Cross Linked; Future  -     Vitamin D 25-Hydroxy,Total (for eval of Vitamin D levels); Future  -     Serum Protein Electrophoresis; Future  -     Follow Up In Rheumatology; Future      Will get prior auth for prolia    Follow up x 6 mo  Deng Bauman MD FACR   of Medicine  [facility] - Department of Rheumatology  [facility]   Plan, including risks and benefits, was discussed with the patient,  informed on how to reach us.     To schedule an appointment, call  [phone] [place] or call [phone] for [facility]

## 2025-04-15 NOTE — PATIENT INSTRUCTIONS
Take 1000 units every day of Vit D  And  1200 mg calcium total daily intake    We will proceed with prior auth of prolia     I will see you back in 6 months

## 2025-04-28 ENCOUNTER — LAB (OUTPATIENT)
Dept: LAB | Facility: HOSPITAL | Age: 75
End: 2025-04-28
Payer: MEDICARE

## 2025-04-28 PROCEDURE — 84165 PROTEIN E-PHORESIS SERUM: CPT

## 2025-04-28 PROCEDURE — 84155 ASSAY OF PROTEIN SERUM: CPT

## 2025-04-29 ENCOUNTER — TELEPHONE (OUTPATIENT)
Dept: RHEUMATOLOGY | Facility: CLINIC | Age: 75
End: 2025-04-29
Payer: MEDICARE

## 2025-04-29 LAB
ALBUMIN: 4 G/DL (ref 3.4–5)
ALPHA 1 GLOBULIN: 0.2 G/DL (ref 0.2–0.6)
ALPHA 2 GLOBULIN: 0.7 G/DL (ref 0.4–1.1)
BETA GLOBULIN: 0.7 G/DL (ref 0.5–1.2)
GAMMA GLOBULIN: 1.3 G/DL (ref 0.5–1.4)
PATH REVIEW-SERUM PROTEIN ELECTROPHORESIS: NORMAL
PROT SERPL-MCNC: 6.8 G/DL (ref 6.4–8.2)
PROTEIN ELECTROPHORESIS COMMENT: NORMAL

## 2025-04-30 ENCOUNTER — TELEPHONE (OUTPATIENT)
Dept: PRIMARY CARE | Facility: CLINIC | Age: 75
End: 2025-04-30
Payer: MEDICARE

## 2025-04-30 DIAGNOSIS — E78.5 HYPERLIPIDEMIA, UNSPECIFIED HYPERLIPIDEMIA TYPE: ICD-10-CM

## 2025-04-30 LAB
25(OH)D3+25(OH)D2 SERPL-MCNC: 50 NG/ML (ref 30–100)
COLLAGEN CTX SERPL-MCNC: 184 PG/ML
PTH-INTACT SERPL-MCNC: 22 PG/ML (ref 16–77)

## 2025-04-30 RX ORDER — ATORVASTATIN CALCIUM 10 MG/1
10 TABLET, FILM COATED ORAL NIGHTLY
Qty: 90 TABLET | Refills: 1 | Status: SHIPPED | OUTPATIENT
Start: 2025-04-30

## 2025-04-30 NOTE — TELEPHONE ENCOUNTER
Refill request     Med name-atorvastatin  Med dose-10mg  Med directions-take 1 tablet daily at bedtime    Pharmacy-[facility]  Pharmacy address-[place]    LR-01/06/25  LV-02/04/25  Nv-08/05/25    Thanks

## 2025-05-01 ENCOUNTER — OFFICE VISIT (OUTPATIENT)
Dept: OBSTETRICS AND GYNECOLOGY | Facility: CLINIC | Age: 75
End: 2025-05-01
Payer: MEDICARE

## 2025-05-01 ENCOUNTER — APPOINTMENT (OUTPATIENT)
Dept: OBSTETRICS AND GYNECOLOGY | Facility: CLINIC | Age: 75
End: 2025-05-01
Payer: MEDICARE

## 2025-05-01 VITALS
SYSTOLIC BLOOD PRESSURE: 116 MMHG | BODY MASS INDEX: 23.04 KG/M2 | DIASTOLIC BLOOD PRESSURE: 74 MMHG | HEIGHT: 63 IN | WEIGHT: 130 LBS

## 2025-05-01 DIAGNOSIS — Z12.4 CERVICAL CANCER SCREENING: ICD-10-CM

## 2025-05-01 DIAGNOSIS — Z01.419 WELL WOMAN EXAM: ICD-10-CM

## 2025-05-01 DIAGNOSIS — R87.613 HGSIL ON PAP SMEAR OF CERVIX: Primary | ICD-10-CM

## 2025-05-01 PROCEDURE — G2211 COMPLEX E/M VISIT ADD ON: HCPCS | Performed by: OBSTETRICS & GYNECOLOGY

## 2025-05-01 PROCEDURE — 1160F RVW MEDS BY RX/DR IN RCRD: CPT | Performed by: OBSTETRICS & GYNECOLOGY

## 2025-05-01 PROCEDURE — 87626 HPV SEP HI-RSK TYP&POOL RSLT: CPT

## 2025-05-01 PROCEDURE — 1123F ACP DISCUSS/DSCN MKR DOCD: CPT | Performed by: OBSTETRICS & GYNECOLOGY

## 2025-05-01 PROCEDURE — 1159F MED LIST DOCD IN RCRD: CPT | Performed by: OBSTETRICS & GYNECOLOGY

## 2025-05-01 PROCEDURE — 3008F BODY MASS INDEX DOCD: CPT | Performed by: OBSTETRICS & GYNECOLOGY

## 2025-05-01 PROCEDURE — 1126F AMNT PAIN NOTED NONE PRSNT: CPT | Performed by: OBSTETRICS & GYNECOLOGY

## 2025-05-01 ASSESSMENT — PAIN SCALES - GENERAL: PAINLEVEL_OUTOF10: 0-NO PAIN

## 2025-05-01 NOTE — PROGRESS NOTES
Subjective   Patient ID: Rosalba Lewis is a 74 y.o. female who presents for Annual Exam (JANNA//LAST PAP:  12/21/2023- WNL -HPV/LAST MAMM:  09/24//Chaperone Declined, BRANDO [place]/).  History of Present Illness  Rosalba Lewis is a 74 year old female with osteoporosis and bladder issues who presents for a follow-up visit.    She experiences urgency with 'rushes of urine' and finds Vibegron effective in controlling this symptom without causing gushing. She continues to perform Kegel exercises. Initially, she was prescribed a different medication that was too expensive, leading to the switch to Vibegron, which costs $70 a month.    She has a history of osteoporosis and was previously on Fosamax, which was discontinued due to ineffectiveness and the presence of compression fractures. She continues to take calcium and vitamin D3 supplements. She is unsure how the compression fractures occurred but mentions past activities such as jumping off a tanja and going down a high slide in [place]. She is feeling better and plans to resume playing pickleball, which she had to pause during her healing process.    Her past medical history includes a LEAP procedure on the cervix and knee surgeries. She has a family history of osteoporosis in her mother, high blood pressure and alcoholism in her father, Parkinson's in one sister, arthritis in two sisters, and a healthy brother. She has two grandchildren and a foster grandchild, and she frequently visits them in [place].    She denies current alcohol use, drug use, and smoking. She is not currently sexually active. She has a history of hypertension, which she attributes to lifestyle factors such as coffee and energy drink consumption, both of which she has reduced. She also mentions a past upper respiratory infection, pink eye, and a dog bite, all of which have resolved. She engages in water aerobics and plans to resume land-based exercises and pickleball.    Review of  "Systems    Objective     /74   Ht 1.6 m (5' 3\")   Wt 59 kg (130 lb)   BMI 23.03 kg/m²     Physical Exam  Vitals reviewed.   Constitutional:       Appearance: Normal appearance. She is normal weight.   HENT:      Head: Normocephalic.   Neck:      Thyroid: No thyroid mass or thyromegaly.   Pulmonary:      Effort: Pulmonary effort is normal.   Chest:   Breasts:     Right: Normal.      Left: Normal.   Abdominal:      Palpations: Abdomen is soft.   Genitourinary:     General: Normal vulva.      Exam position: Lithotomy position.      Vagina: Normal.      Cervix: Normal.      Uterus: Normal.       Adnexa: Right adnexa normal and left adnexa normal.      Comments:   Breasts:     Right: Normal appearance, no masses     Left: Normal appearance, no masses    Genitourinary:     General: Normal vulva.      Bartholins's glands, urethra and skene's glands: Normal.      Bladder: Normal.     Vagina: Normal.      Cervix: Normal.      Uterus: Normal.       Adnexa: Right adnexa: normal, left adnexa: Normal.      Anus- Normal     Musculoskeletal:         General: Normal range of motion.      Cervical back: Normal range of motion and neck supple.   Lymphadenopathy:      Upper Body:      Right upper body: No supraclavicular or axillary adenopathy.      Left upper body: No supraclavicular or axillary adenopathy.   Skin:     General: Skin is warm and dry.   Neurological:      General: No focal deficit present.      Mental Status: She is alert.   Psychiatric:         Mood and Affect: Mood normal.         Behavior: Behavior normal.         Thought Content: Thought content normal.         Judgment: Judgment normal.           Physical Exam      Assessment & Plan  Follow-up  Awaiting Pap test results, expected in two weeks.  - Follow up on Pap test results in two weeks    Overactive Bladder  Experiencing urinary urgency and rushes, managed with Vibegron, which reduces urgency and prevents gushing but does not completely eliminate the " urge. Continues Kegel exercises. Vibegron was chosen over a more expensive alternative.  - Continue Vibegron  - Continue Kegel exercises  - Discuss insurance coverage for Vibegron    Osteoporosis with Compression Fractures  Osteoporosis with compression fractures, previously treated with ineffective Fosamax. Under rheumatologist care, biannual injections recommended. Takes calcium and vitamin D3 supplements. No acute pain, mild back discomfort likely due to arthritis. Compression fractures likely healed, resuming activities like pickleball.  - Initiate biannual injections  - Continue calcium and vitamin D3 supplementation    General Health Maintenance  Good health with resolved upper respiratory infection, conjunctivitis, and toe redness. Family history of osteoporosis, hypertension, alcoholism, Parkinson's disease, and arthritis. Lifestyle changes include reduced coffee and energy drink consumption, cessation of smoking and alcohol use, and regular physical activity such as water aerobics and pickleball.  - Encourage regular physical activity  - Maintain lifestyle modifications        Anju Moreland MD     This medical note was created with the assistance of artificial intelligence (AI) for documentation purposes. The content has been reviewed and confirmed by the healthcare provider for accuracy and completeness. Patient consented to the use of audio recording and use of AI during their visit.

## 2025-05-02 PROBLEM — Z01.419 WELL WOMAN EXAM: Status: ACTIVE | Noted: 2025-05-02

## 2025-05-05 ENCOUNTER — SPECIALTY PHARMACY (OUTPATIENT)
Dept: PHARMACY | Facility: CLINIC | Age: 75
End: 2025-05-05

## 2025-05-05 DIAGNOSIS — M85.89 OSTEOPENIA OF MULTIPLE SITES: ICD-10-CM

## 2025-05-05 DIAGNOSIS — S32.030A COMPRESSION FRACTURE OF L3 LUMBAR VERTEBRA, CLOSED, INITIAL ENCOUNTER (MULTI): ICD-10-CM

## 2025-05-05 DIAGNOSIS — S32.020S COMPRESSION FRACTURE OF L2 VERTEBRA, SEQUELA: Primary | ICD-10-CM

## 2025-05-05 RX ORDER — FAMOTIDINE 10 MG/ML
20 INJECTION, SOLUTION INTRAVENOUS ONCE AS NEEDED
OUTPATIENT
Start: 2025-05-12

## 2025-05-05 RX ORDER — ALBUTEROL SULFATE 0.83 MG/ML
3 SOLUTION RESPIRATORY (INHALATION) AS NEEDED
OUTPATIENT
Start: 2025-05-12

## 2025-05-05 RX ORDER — EPINEPHRINE 0.3 MG/.3ML
0.3 INJECTION SUBCUTANEOUS EVERY 5 MIN PRN
OUTPATIENT
Start: 2025-05-12

## 2025-05-05 RX ORDER — DIPHENHYDRAMINE HYDROCHLORIDE 50 MG/ML
50 INJECTION, SOLUTION INTRAMUSCULAR; INTRAVENOUS AS NEEDED
OUTPATIENT
Start: 2025-05-12

## 2025-05-05 NOTE — PROGRESS NOTES
New start Prolia - patient has osteopenia T scores with high FRAX. Cannot take bisohosphonates oral or IV due to decreased kidney function. Updated therapy plan sent to [facility] for the [facility]

## 2025-05-08 ENCOUNTER — PATIENT MESSAGE (OUTPATIENT)
Dept: INFUSION THERAPY | Facility: CLINIC | Age: 75
End: 2025-05-08
Payer: MEDICARE

## 2025-05-08 ENCOUNTER — DOCUMENTATION (OUTPATIENT)
Dept: INFUSION THERAPY | Facility: CLINIC | Age: 75
End: 2025-05-08
Payer: MEDICARE

## 2025-05-08 NOTE — PROGRESS NOTES
"CLINICAL CLEARANCE FOR OUTPATIENT INJECTION      Patient to be scheduled for New Start of DENOSUMAB injections.    For Diagnosis: Osteoporosis    Labs required prior to treatment (place lab orders if not already ordered or completed): Patient will need new labs drawn before Prolia appointment. Order is in.     Calcium drawn on:   Lab Results   Component Value Date    CALCIUM 9.9 02/04/2025      Lab Results   Component Value Date    ALBUMIN 4.8 02/04/2025     No results found for: \"CAION\"   Calcium >8.6? Yes   (Serum or Corrected Calcium must be >8.6mg/dl. OR Ionized Calcium must be >1.1 mmol/L or >4.7 mg/dL (depending on resulting agency).  If below WNL - Contact prescribing provider. Labs should be drawn within 28 days of first treatment.)    Lab Results   Component Value Date    EGFR 96 02/04/2025      (Patients with a eGFR <30 are at increased risk of hypocalcemia)      *If eGFR less than 30 reach out to prescribing provider to discuss need for frequent lab monitoring and supplementation. Recommendation is to Monitor serum calcium weekly for the first month after initiation and then at least monthly thereafter due to increased risk for hypocalcemia.    Calcium and Vitamin D supplement on medication list? Yes  (if no nurse to encourage discussion of supplementation at visit)    Current Outpatient Medications:     atorvastatin (Lipitor) 10 mg tablet, Take 1 tablet (10 mg) by mouth once daily at bedtime., Disp: 90 tablet, Rfl: 1    calcium carbonate-vitamin D3 500 mg-5 mcg (200 unit) tablet, Take 1 tablet by mouth 2 times a day., Disp: , Rfl:     coenzyme Q-10 100 mg capsule, Take 2 capsules (200 mg) by mouth once daily., Disp: , Rfl:     cyanocobalamin (Vitamin B-12) 100 mcg tablet, Take 1 tablet (100 mcg) by mouth once daily., Disp: 90 tablet, Rfl: 1    denosumab (Prolia) 60 mg/mL syringe, inject 60mg/mL under the skin once every 6 months, Disp: 1 mL, Rfl: 1    ferrous sulfate 325 (65 Fe) MG tablet, Take 1 tablet " (325 mg) by mouth once daily., Disp: , Rfl:     magnesium oxide (Mag-Ox) 400 mg (241.3 mg magnesium) tablet, Take 1 tablet (400 mg) by mouth once daily., Disp: 90 tablet, Rfl: 3    mesalamine ER (Apriso) 0.375 gram 24 hr capsule, Take 3 capsules (1.125 g) by mouth once daily., Disp: , Rfl:     methylPREDNISolone (Medrol Dospak) 4 mg tablets, Follow schedule on package instructions, Disp: 1 each, Rfl: 0    multivitamin tablet, Take by mouth., Disp: , Rfl:     vibegron 75 mg tablet, Take 1 tablet (75 mg) by mouth once daily., Disp: 30 tablet, Rfl: 11     No obvious recent dental work per chart review. Nurse to confirm no dental within past/next 4 weeks at encounter.    Urine Hcg test ordered prior to first injection?Not applicable (If female pt <60 years of age and with reproductive capability)(If not ordered and is indicated place order)    Last injection received: N/A (if continuation)    Due: [id number] to call and schedule patient.      Ok to schedule for prolia; please instruct pt to have labs drawn no more than 28 days prior to their scheduled appointment

## 2025-05-09 ENCOUNTER — LAB (OUTPATIENT)
Dept: LAB | Facility: HOSPITAL | Age: 75
End: 2025-05-09
Payer: MEDICARE

## 2025-05-09 LAB
ALBUMIN SERPL BCP-MCNC: 4.1 G/DL (ref 3.4–5)
ALP SERPL-CCNC: 44 U/L (ref 33–136)
ALT SERPL W P-5'-P-CCNC: 16 U/L (ref 7–45)
ANION GAP SERPL CALC-SCNC: 10 MMOL/L (ref 10–20)
AST SERPL W P-5'-P-CCNC: 20 U/L (ref 9–39)
BILIRUB SERPL-MCNC: 0.8 MG/DL (ref 0–1.2)
BUN SERPL-MCNC: 10 MG/DL (ref 6–23)
CALCIUM SERPL-MCNC: 9.2 MG/DL (ref 8.6–10.3)
CHLORIDE SERPL-SCNC: 107 MMOL/L (ref 98–107)
CO2 SERPL-SCNC: 27 MMOL/L (ref 21–32)
CREAT SERPL-MCNC: 0.56 MG/DL (ref 0.5–1.05)
CYTOLOGY CMNT CVX/VAG CYTO-IMP: NORMAL
EGFRCR SERPLBLD CKD-EPI 2021: >90 ML/MIN/1.73M*2
GLUCOSE SERPL-MCNC: 91 MG/DL (ref 74–99)
HPV HR 12 DNA GENITAL QL NAA+PROBE: NEGATIVE
HPV HR GENOTYPES PNL CVX NAA+PROBE: NEGATIVE
HPV16 DNA SPEC QL NAA+PROBE: NEGATIVE
HPV18 DNA SPEC QL NAA+PROBE: NEGATIVE
LAB AP HPV GENOTYPE QUESTION: YES
LAB AP HPV HR: NORMAL
LABORATORY COMMENT REPORT: NORMAL
PATH REPORT.TOTAL CANCER: NORMAL
POTASSIUM SERPL-SCNC: 4 MMOL/L (ref 3.5–5.3)
PROT SERPL-MCNC: 6.6 G/DL (ref 6.4–8.2)
SODIUM SERPL-SCNC: 140 MMOL/L (ref 136–145)

## 2025-05-09 PROCEDURE — 80053 COMPREHEN METABOLIC PANEL: CPT

## 2025-05-12 DIAGNOSIS — M85.89 OSTEOPENIA OF MULTIPLE SITES: ICD-10-CM

## 2025-05-12 DIAGNOSIS — S32.020S COMPRESSION FRACTURE OF L2 VERTEBRA, SEQUELA: ICD-10-CM

## 2025-05-12 DIAGNOSIS — S32.030A COMPRESSION FRACTURE OF L3 LUMBAR VERTEBRA, CLOSED, INITIAL ENCOUNTER (MULTI): ICD-10-CM

## 2025-05-19 ENCOUNTER — APPOINTMENT (OUTPATIENT)
Dept: UROLOGY | Facility: CLINIC | Age: 75
End: 2025-05-19
Payer: MEDICARE

## 2025-05-19 VITALS — SYSTOLIC BLOOD PRESSURE: 141 MMHG | DIASTOLIC BLOOD PRESSURE: 87 MMHG | HEART RATE: 88 BPM

## 2025-05-19 DIAGNOSIS — N32.81 OAB (OVERACTIVE BLADDER): ICD-10-CM

## 2025-05-19 DIAGNOSIS — N39.498 OTHER SPECIFIED URINARY INCONTINENCE: ICD-10-CM

## 2025-05-19 PROCEDURE — 99213 OFFICE O/P EST LOW 20 MIN: CPT | Performed by: NURSE PRACTITIONER

## 2025-05-19 PROCEDURE — 1159F MED LIST DOCD IN RCRD: CPT | Performed by: NURSE PRACTITIONER

## 2025-05-19 RX ORDER — VIBEGRON 75 MG/1
75 TABLET, FILM COATED ORAL DAILY
Qty: 28 TABLET | Refills: 0 | COMMUNITY
Start: 2025-05-19 | End: 2025-06-16

## 2025-05-19 NOTE — PROGRESS NOTES
"Subjective   Patient ID: Rosalba Lewis is a 74 y.o. female who presents for No chief complaint on file..  1. UUI/OAB previously on trospium           1a. Now s/p MOTIATION study  2. POP-Q 8/12/21 :Aa: -2.5. Ba: -2.5 C: -5 Gh: 3. Pb: 3 TVL: 7 Ap: -2.5. Bp: -2.5. D: -6  3. Ulcerative colitis  4. 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 Osteoporosis     Patient is endorsing worsening OAB complaints. Patient reports DTFx 5-6 and NTF x3-4. She uses 4 pads during the day which are damps vs soaked at times. She denies any vaginal complaints, no abnormal bleeding or discharge.      Started on myrbetriq in April by Dr. Peña however it was cost prohibitive. Doing \"amazing\" on Gemtesa. Very pleased. Able to obtain with co pay assistance card.         Review of Systems   All other systems reviewed and are negative.      Objective   Physical Exam  Vitals reviewed.     Alert and oriented x3  Moist mucous membranes  Breathes easily on room air  Abdomen soft, nondistended  No edema  No scleral icterus  No focal neurological deficits  Appears stated age, no acute distress    PVR=43ml    Assessment/Plan   Diagnoses and all orders for this visit:  OAB (overactive bladder)  -     Referral to Clinical Pharmacy; Future  -     vibegron (Gemtesa) 75 mg tablet; Take 1 tablet (75 mg) by mouth once daily for 28 days.  Other specified urinary incontinence  -     Post-Void Residual    Plan to continue on gemtesa indefinitely.  Patient verbalized understanding of plan. Arrange for 6 month follow up.         MICHAELA German-CNP 05/19/25 3:35 PM   "

## 2025-05-29 ENCOUNTER — APPOINTMENT (OUTPATIENT)
Dept: INFUSION THERAPY | Facility: CLINIC | Age: 75
End: 2025-05-29
Payer: MEDICARE

## 2025-05-29 VITALS
DIASTOLIC BLOOD PRESSURE: 81 MMHG | TEMPERATURE: 97.5 F | OXYGEN SATURATION: 98 % | SYSTOLIC BLOOD PRESSURE: 138 MMHG | RESPIRATION RATE: 17 BRPM | HEART RATE: 77 BPM

## 2025-05-29 DIAGNOSIS — S32.020S COMPRESSION FRACTURE OF L2 VERTEBRA, SEQUELA: ICD-10-CM

## 2025-05-29 DIAGNOSIS — M85.89 OSTEOPENIA OF MULTIPLE SITES: ICD-10-CM

## 2025-05-29 DIAGNOSIS — S32.030A COMPRESSION FRACTURE OF L3 LUMBAR VERTEBRA, CLOSED, INITIAL ENCOUNTER (MULTI): ICD-10-CM

## 2025-05-29 PROCEDURE — 96372 THER/PROPH/DIAG INJ SC/IM: CPT | Performed by: REGISTERED NURSE

## 2025-05-29 RX ORDER — FAMOTIDINE 10 MG/ML
20 INJECTION, SOLUTION INTRAVENOUS ONCE AS NEEDED
OUTPATIENT
Start: 2025-11-02

## 2025-05-29 RX ORDER — DIPHENHYDRAMINE HYDROCHLORIDE 50 MG/ML
50 INJECTION, SOLUTION INTRAMUSCULAR; INTRAVENOUS AS NEEDED
OUTPATIENT
Start: 2025-11-02

## 2025-05-29 RX ORDER — EPINEPHRINE 0.3 MG/.3ML
0.3 INJECTION SUBCUTANEOUS EVERY 5 MIN PRN
OUTPATIENT
Start: 2025-11-02

## 2025-05-29 RX ORDER — ALBUTEROL SULFATE 0.83 MG/ML
3 SOLUTION RESPIRATORY (INHALATION) AS NEEDED
OUTPATIENT
Start: 2025-11-02

## 2025-05-29 ASSESSMENT — ENCOUNTER SYMPTOMS
CONSTIPATION: 0
WOUND: 0
VOICE CHANGE: 0
LIGHT-HEADEDNESS: 0
SORE THROAT: 0
LEG SWELLING: 0
DIZZINESS: 0
NUMBNESS: 0
WHEEZING: 0
VOMITING: 0
DIARRHEA: 0
COUGH: 0
DYSURIA: 0
BLOOD IN STOOL: 0
SHORTNESS OF BREATH: 0
FEVER: 0
APPETITE CHANGE: 0
TROUBLE SWALLOWING: 0
HEMATURIA: 0
UNEXPECTED WEIGHT CHANGE: 0
FATIGUE: 0
ABDOMINAL PAIN: 0
EYE PROBLEMS: 0
FREQUENCY: 0
NAUSEA: 0
HEADACHES: 0
PALPITATIONS: 0
ARTHRALGIAS: 0
MYALGIAS: 0

## 2025-05-29 NOTE — PROGRESS NOTES
[facility]   Infusion Clinic Note   Date: May 29, 2025   Name: Rosalba Lewis  : 1950   MRN: [id number]         Reason for Visit: Injections (Prolia 60 mg)         Today: We administered denosumab.       Ordered By: Deng Bauman MD       For a Diagnosis of: Compression fracture of L2 vertebra, sequela    Compression fracture of L3 lumbar vertebra, closed, initial encounter (Multi)    Osteopenia of multiple sites       At today's visit patient accompanied by: Self      Today's Vitals:   Vitals:    25 1657   BP: 138/81   Pulse: 77   Resp: 17   Temp: 36.4 °C (97.5 °F)   SpO2: 98%             Pre - Treatment Checklist:      - Previous reaction to current treatment: n/a      (Assess patient for the concerns below. Document provider notification as appropriate).  - Active or recent infection with/without current antibiotic use: no  - Recent or planned invasive dental work: no  - Recent or planned surgeries: no  - Recently received or plans to receive vaccinations: no  - Has treatment related toxicities: no  - Any chance may be pregnant:  no      Pain: 0   - Is the pain different from normal: no   - Is prescribing Doctor aware:  n/a      Labs: Reviewed       Fall Risk Screening: Garcia Fall Risk  History of Falling, Immediate or Within 3 Months: No  Secondary Diagnosis: No  Ambulatory Aid: Walks without aid/bedrest/nurse assist  Intravenous Therapy/Heparin Lock: No  Gait/Transferring: Normal/bedrest/immobile  Mental Status: Oriented to own ability  Garcia Fall Risk Score: 0       Review Of Systems:  Review of Systems   Constitutional:  Negative for appetite change, fatigue, fever and unexpected weight change.   HENT:   Negative for hearing loss, mouth sores, sore throat, trouble swallowing and voice change.    Eyes:  Negative for eye problems.   Respiratory:  Negative for cough, shortness of breath and wheezing.    Cardiovascular:  Negative for chest pain, leg swelling and  "palpitations.   Gastrointestinal:  Negative for abdominal pain, blood in stool, constipation, diarrhea, nausea and vomiting.   Genitourinary:  Negative for dysuria, frequency and hematuria.    Musculoskeletal:  Negative for arthralgias and myalgias.   Skin:  Negative for rash and wound.   Neurological:  Negative for dizziness, headaches, light-headedness and numbness.         Infusion Readiness:  - Assessment Concerns Related to Infusion: No  - Provider notified: no      New Patient Education:    NEW PATIENT MEDICATION EDUCATION PT PROVIDED WITH WRITTEN (tab ticketbroker PT EDUCATION SHEET) AND VERBAL EDUCATION REGARDING MEDICATION GIVEN. VERIFIED MEDICATION NAME WITH PATIENT AND DISCUSSED REASON FOR USE. BRIEFLY DISCUSSED HOW MEDICATION WORKS AND EDUCATED ON GOAL OF TREATMENT, FREQUENCY OF TREATMENT, ADVERSE RXN'S AND COMMON SIDE EFFECTS TO MONITOR FOR. INSTRUCTED PT TO ASSURE THAT ALL PROVIDERS INCLUDING DENTISTS ARE AWARE OF MEDICATION RECEIVED. DISCUSSED FLOW OF VISIT AND ORIENTED TO INFUSION CENTER. PT VERBALIZES UNDERSTANDING. CALL LIGHT PROVIDED AND PT AWARE TO ALERT STAFF OF ANY CONCERNS DURING TREATMENT.        Treatment Conditions & Drug Specific Questions:    Denosumab  (PROLIA. XGEVA. STOBOCLO)    (Unless otherwise specified on patient specific therapy plan):     TREATMENT CONDITIONS:  Unless otherwise specified on patient specific therapy plan HOLD and notify provider prior to proceeding with today's injection if patients:  O Corrected or Serum Calcium LESS THAN 8.6 mg/dL  OR Ionized calcium less than 1.1 mmol/L or  less than 4.7 mg/dL (depending on resulting agency)  o Recent or planned invasive dental procedure (within 4 weeks)    Lab Results   Component Value Date    CALCIUM 9.2 05/09/2025      No results found for: \"CAION\"    Patient meets treatment conditions? Yes    DRUG SPECIFIC QUESTIONS:  Is the patient taking calcium and vitamin D? Yes  (Recommended)    Pt Instructed on following risks: (1) " hypocalcemia, (2) osteonecrosis of the jaw, (3) atypical femoral fractures, (4) serious infections, and (5) dermatologic reactions?  Yes      REMINDER:  PREGNANCY CATEGORY X DRUG. OBTAIN NEGTATIVE PREGNANCY TEST PRIOR TO FIRST INFUSION FOR WOMEN OF CHILDBEARING ABILITY   REMS DRUG    Recommended Vitals/Observation:  Vitals: Obtain vitals prior to injection.  Observation: Patient may leave immediately following injection.        Weight Based Drug Calculations:    WEIGHT BASED DRUGS: NOT APPLICABLE / FLAT DOSE       Post Treatment: Patient tolerated treatment without issue and was discharged in no apparent distress.      Note Authored / Patient Cared for By: Karma Mayen RN

## 2025-05-29 NOTE — PATIENT INSTRUCTIONS
Today :We administered denosumab.     For:   1. Compression fracture of L2 vertebra, sequela    2. Compression fracture of L3 lumbar vertebra, closed, initial encounter (Multi)    3. Osteopenia of multiple sites         Your next appointment is due in:  6 months * have calcium level drawn at least 1 week prior to next appt        Please read the  Medication Guide that was given to you and reviewed during todays visit.     (Tell all doctors including dentists that you are taking this medication)     Go to the emergency room or call 911 if:  -You have signs of allergic reaction:   -Rash, hives, itching.   -Swollen, blistered, peeling skin.   -Swelling of face, lips, mouth, tongue or throat.   -Tightness of chest, trouble breathing, swallowing or talking     Call your doctor:  - If IV / injection site gets red, warm, swollen, itchy or leaks fluid or pus.     (Leave dressing on your IV site for at least 2 hours and keep area clean and dry  - If you get sick or have symptoms of infection or are not feeling well for any reason.    (Wash your hands often, stay away from people who are sick)  - If you have side effects from your medication that do not go away or are bothersome.     (Refer to the teaching your nurse gave you for side effects to call your doctor about)    - Common side effects may include:  stuffy nose, headache, feeling tired, muscle aches, upset stomach  - Before receiving any vaccines     - Call the [facility] at   If:  - You get sick, are on antibiotics, have had a recent vaccine, have surgery or dental work and your doctor wants your visit rescheduled.  - You need to cancel and reschedule your visit for any reason. Call at least 2 days before your visit if you need to cancel.   - Your insurance changes before your next visit.    (We will need to get approval from your new insurance. This can take up to two weeks.)     The [facility] is opened Monday thru Friday. We are  closed on weekends and holidays.   Voice mail will take your call if the center is closed. If you leave a message please allow 24 hours for a call back during weekdays. If you leave a message on a weekend/holiday, we will call you back the next business day.    A pharmacist is available Monday - Friday from 8:30AM to 3:30PM to help answer any questions you may have about your prescriptions(s). Please call pharmacy at:    [facility]: [phone]  [facility]: [phone]  [facility]: [phone]

## 2025-06-12 ENCOUNTER — APPOINTMENT (OUTPATIENT)
Dept: PHARMACY | Facility: HOSPITAL | Age: 75
End: 2025-06-12
Payer: MEDICARE

## 2025-06-12 DIAGNOSIS — N32.81 OAB (OVERACTIVE BLADDER): Primary | ICD-10-CM

## 2025-06-12 RX ORDER — VIT C/E/ZN/COPPR/LUTEIN/ZEAXAN 250MG-90MG
25 CAPSULE ORAL DAILY
COMMUNITY

## 2025-06-12 RX ORDER — FLAXSEED OIL 1000 MG
1 CAPSULE ORAL DAILY
COMMUNITY

## 2025-06-12 RX ORDER — GLUCOSAMINE/CHONDRO SU A 500-400 MG
1 TABLET ORAL DAILY
COMMUNITY

## 2025-06-12 NOTE — PROGRESS NOTES
"  Patient ID: Rosalba Lewis is a 75 y.o. female who presents for No chief complaint on file..    Pt is here for First appointment.     Referring Provider: Viri Galindo AP*  Reason for Referral: OAB, gemtesa assistance    Subjective     Medication and allergy reconciliation completed     Drug Interactions  No relevant drug interactions were noted.    Medication System Management  Patient's preferred pharmacy:     [id number] - [place], [place] - [address]  [address] [facility] [place] [address]  Phone: [phone] Fax: [phone]    [facility] [place] - [facility] AT [facility]  [place] [address]  Phone: [phone] Fax: [phone]    Adherence/Organization: taking meds as prescribed  Affordability/Accessibility: Assess for [facility]      Medications Ordered Prior to Encounter[1]          Urinary Symptoms  Medications that may contribute to symptoms:   none  Any history of:   Narrow-angle glaucoma: no  Impaired gastric emptying: no  Urinary retention: no  Prediabetes or diabetes:  no DM dx  Lab Results   Component Value Date    GLUCOSE 91 05/09/2025     No results found for: \"LEPTIN\", \"INSULFAST\", \"GLUF\"  Frequently of bowel movement: no more than twice a day  Tobacco use: no  How many protective undergarments are you utilizing daily: no pad     What medications have been tried/stopped?   none  Current medication? Gemtesa 75 mg daily  When started? 5/2025  Side effects? none  Improvement in symptoms? \"It does the trick\"      Cardiovascular Health  The 10-year ASCVD risk score ([place] WILKS, et al., 2019) is: 18.2%    Values used to calculate the score:      Age: 75 years      Sex: Female      Is Non- : No      Diabetic: No      Tobacco smoker: No      Systolic Blood Pressure: 138 mmHg      Is BP treated: No      HDL Cholesterol: 85 mg/dL      Total Cholesterol: 166 mg/dL    Lab " "Results   Component Value Date    CHOL 166 02/04/2025     Lab Results   Component Value Date    HDL 85 02/04/2025     Lab Results   Component Value Date    LDLCALC 63 02/04/2025     Lab Results   Component Value Date    TRIG 101 02/04/2025     No components found for: \"CHOLHDL\"     Vitals  BP Readings from Last 2 Encounters:   05/29/25 138/81   05/19/25 141/87     BMI Readings from Last 1 Encounters:   05/01/25 23.03 kg/m²        BMP  Lab Results   Component Value Date    CALCIUM 9.2 05/09/2025     05/09/2025    K 4.0 05/09/2025    CO2 27 05/09/2025     05/09/2025    BUN 10 05/09/2025    CREATININE 0.56 05/09/2025    EGFR >90 05/09/2025     Make sure GFR >15 ml/min or dose adjust    LFTs  Lab Results   Component Value Date    ALT 16 05/09/2025    AST 20 05/09/2025    ALKPHOS 44 05/09/2025    BILITOT 0.8 05/09/2025      patient assistance program    Discussed  Patient Assistance Program with the patient, however patient states they will not qualify based on their income. We will re-evaluate at future appointment    Assessment/Plan     Patient was experiencing urinary frequency, urgency, and incontinence. Patient noted good improvement with Gemtesa, no side effect  Has tried before none    Gemtesa   Discussed MOA: works by activating beta-3 adrenergic receptors in the bladder resulting in relaxation of the detrusor smooth muscle during the urine storage phase, thus increasing bladder capacity.  Provided education on administration and potential side effects including but not limited to hypertension 9%, hot flashes <2%, constipation/diarrhea <2%, dry mouth <2%, and headache <4%.   Gemtesa (vibegron) starts working almost immediately - within a few days of first taking it, with noticeable improvements in urinary urgency, frequency, and incontinence noted in clinical trials at 2 weeks which were reported as significant by 12 weeks.    Continue   Gemtesa 75 mg once daily    Follow-up: 12/9/2025 3:00 PM    "     Time spent with pt: Total length of time 20 (minutes) of the encounter and more than 50% was spent counseling the patient.      Yani Shell PharmD   Meds Clinical Pharmacist  Phone: [phone]     Continue all meds under the continuation of care with the referring provider and clinical pharmacy team.    Verbal consent to manage patient's drug therapy was obtained from the patient. They were informed they may decline to participate or withdraw from participation in pharmacy services at any time.         [1]   Current Outpatient Medications on File Prior to Visit   Medication Sig Dispense Refill    atorvastatin (Lipitor) 10 mg tablet Take 1 tablet (10 mg) by mouth once daily at bedtime. 90 tablet 1    calcium carbonate-vitamin D3 500 mg-5 mcg (200 unit) tablet Take 1 tablet by mouth 2 times a day.      coenzyme Q-10 100 mg capsule Take 2 capsules (200 mg) by mouth once daily.      cyanocobalamin (Vitamin B-12) 100 mcg tablet Take 1 tablet (100 mcg) by mouth once daily. 90 tablet 1    denosumab (Prolia) 60 mg/mL syringe inject 60mg/mL under the skin once every 6 months 1 mL 1    ferrous sulfate 325 (65 Fe) MG tablet Take 1 tablet (325 mg) by mouth once daily.      magnesium oxide (Mag-Ox) 400 mg (241.3 mg magnesium) tablet Take 1 tablet (400 mg) by mouth once daily. 90 tablet 3    mesalamine ER (Apriso) 0.375 gram 24 hr capsule Take 3 capsules (1.125 g) by mouth once daily.      methylPREDNISolone (Medrol Dospak) 4 mg tablets Follow schedule on package instructions 1 each 0    multivitamin tablet Take by mouth.      vibegron (Gemtesa) 75 mg tablet Take 1 tablet (75 mg) by mouth once daily for 28 days. 28 tablet 0    vibegron 75 mg tablet Take 1 tablet (75 mg) by mouth once daily. 30 tablet 11     No current facility-administered medications on file prior to visit.

## 2025-06-16 ENCOUNTER — TELEPHONE (OUTPATIENT)
Dept: PRIMARY CARE | Facility: CLINIC | Age: 75
End: 2025-06-16

## 2025-06-16 ENCOUNTER — OFFICE VISIT (OUTPATIENT)
Dept: PRIMARY CARE | Facility: CLINIC | Age: 75
End: 2025-06-16
Payer: MEDICARE

## 2025-06-16 VITALS
TEMPERATURE: 98.2 F | DIASTOLIC BLOOD PRESSURE: 86 MMHG | BODY MASS INDEX: 22.53 KG/M2 | SYSTOLIC BLOOD PRESSURE: 140 MMHG | WEIGHT: 127.21 LBS

## 2025-06-16 DIAGNOSIS — E53.8 VITAMIN B12 DEFICIENCY: ICD-10-CM

## 2025-06-16 DIAGNOSIS — J02.9 PHARYNGITIS, UNSPECIFIED ETIOLOGY: Primary | ICD-10-CM

## 2025-06-16 LAB
POC RAPID INFLUENZA A: NEGATIVE
POC RAPID INFLUENZA B: NEGATIVE
POC RAPID STREP: NEGATIVE
POC SARS-COV-2 AG BINAX: NORMAL

## 2025-06-16 PROCEDURE — 1159F MED LIST DOCD IN RCRD: CPT | Performed by: FAMILY MEDICINE

## 2025-06-16 PROCEDURE — 99213 OFFICE O/P EST LOW 20 MIN: CPT | Performed by: FAMILY MEDICINE

## 2025-06-16 PROCEDURE — 87811 SARS-COV-2 COVID19 W/OPTIC: CPT | Performed by: FAMILY MEDICINE

## 2025-06-16 PROCEDURE — 87880 STREP A ASSAY W/OPTIC: CPT | Performed by: FAMILY MEDICINE

## 2025-06-16 PROCEDURE — 1160F RVW MEDS BY RX/DR IN RCRD: CPT | Performed by: FAMILY MEDICINE

## 2025-06-16 PROCEDURE — 87804 INFLUENZA ASSAY W/OPTIC: CPT | Performed by: FAMILY MEDICINE

## 2025-06-16 RX ORDER — PNV NO.95/FERROUS FUM/FOLIC AC 28MG-0.8MG
100 TABLET ORAL DAILY
Qty: 90 TABLET | Refills: 1 | Status: SHIPPED | OUTPATIENT
Start: 2025-06-16

## 2025-06-16 NOTE — PATIENT INSTRUCTIONS
This Viral Upper Respiratory Infection should resolve on its own after a total of 10 to 14 days.  Please call if you develop shortness of breath, a persistent temperature over 100 degrees in 48 hours, or feel worse in any way. Stay self-isolated and away from others until at least 24 hours after both your symptoms are getting better overall, and you have not had a fever    Remember to push fluids, get plenty of rest and take Tylenol and/or Chloraseptic products for discomfort.

## 2025-06-16 NOTE — PROGRESS NOTES
Subjective   Patient ID: [id number]     Rosalba Lewis is a 75 y.o. female who presents for Sore Throat (Since 6/12/25).    HPI  Four days of sore throat, cough and laryngitis;  theses symptoms appear to be worsening    Review of Systems  GEN-no fever or chills  OPTH-no photophobia   ENT-No earache; decreased sense of smell and taste  NECK-no change in chronic stiffness; no sneezing  PULM-no wheezing,  or shortness of breath  GI-no diarrhea or abdominal pain  UROL-urinating regularly and without pain    Objective     /86 (BP Location: Right arm, Patient Position: Sitting)   Temp 36.8 °C (98.2 °F) (Oral)   Wt 57.7 kg (127 lb 3.3 oz)   BMI 22.53 kg/m²      Physical Exam  General- well defined, well nourished, well hydrated individual in NAD  Skin- normal color and turgor  Head-not tender to percussion  Ears-normal pinnae, and canals, with normal landmarks and light reflex of tympanic membranes bilaterally  Nose-septum in the midline, normal mucosa bilaterally  Throat- without erythema or exudate, uvula in midline  Neck-supple without lymphadenopathy or thyromegaly; no carotid bruits  Heart- regular rate and rhythm, normal s1 and s2 without murmur or gallop; peripheral pulses 2+ and symmetrical  Lungs-clear to auscultation    Assessment/Plan     Problem List Items Addressed This Visit    None  Visit Diagnoses         Pharyngitis, unspecified etiology    -  Primary    Relevant Orders    POCT rapid strep A manually resulted    POCT Influenza A/B manually resulted    POCT BinaxNOW Covid-19 Ag Card manually resulted          This Viral Upper Respiratory Infection should resolve on its own after a total of 10 to 14 days.  Please call if you develop shortness of breath, a persistent temperature over 100 degrees in 48 hours, or feel worse in any way. Stay self-isolated and away from others until at least 24 hours after both your symptoms are getting better overall, and you have not had a fever    Remember to push  fluids, get plenty of rest and take Tylenol and/or Chloraseptic products for discomfort.        Prieto Patricia MD

## 2025-08-04 ENCOUNTER — APPOINTMENT (OUTPATIENT)
Dept: DERMATOLOGY | Facility: CLINIC | Age: 75
End: 2025-08-04
Payer: MEDICARE

## 2025-08-04 DIAGNOSIS — L82.1 SEBORRHEIC KERATOSIS: Primary | ICD-10-CM

## 2025-08-04 DIAGNOSIS — L57.0 ACTINIC KERATOSIS: ICD-10-CM

## 2025-08-04 PROCEDURE — 1159F MED LIST DOCD IN RCRD: CPT | Performed by: DERMATOLOGY

## 2025-08-04 PROCEDURE — 17003 DESTRUCT PREMALG LES 2-14: CPT

## 2025-08-04 PROCEDURE — 17000 DESTRUCT PREMALG LESION: CPT

## 2025-08-04 PROCEDURE — 99213 OFFICE O/P EST LOW 20 MIN: CPT | Performed by: DERMATOLOGY

## 2025-08-04 RX ORDER — BISMUTH SUBSALICYLATE 262 MG
1 TABLET,CHEWABLE ORAL DAILY
COMMUNITY
End: 2025-08-05 | Stop reason: SDUPTHER

## 2025-08-04 NOTE — PROGRESS NOTES
Subjective   Patient ID: [id number]     Rosalba Lewis is a 75 y.o. female who presents for Med Management.    HPI  Taking meds as directed without tolerability or affordability issues; no complaints today.    Review of Systems  GEN-denies weakness or myalgias; no unexplained fever or chills  OPTH-No dry eyes, itchy eyes, or blurry vision   ENT-No hearing loss, tinnitus or vertigo  NECK-no stiffness, swelling or pain  PSYCH-No complaints regarding appetite, memory or concentration;  no drug use or alcohol usage over six per week  SLEEP-No complaints of insomnia, apnea or restless legs;  patient is waking up rested  ALL/IMMUN-No history of sneezing or itching  HEM-No unexplained bruising or bleeding    Objective     /68 (BP Location: Right arm, Patient Position: Sitting)   Temp 36.8 °C (98.3 °F) (Oral)   Wt 57.1 kg (125 lb 14.1 oz)   BMI 22.30 kg/m²      Physical Exam  General- well defined, well nourished, well hydrated individual in NAD  Skin- normal color and turgor; without nail pitting  Head-normocephalic without masses or tenderness  Eyes-pupils equal round, reactive to light and accommodation, fundi with normal cup/disc ratio, conjunctiva without redness or discharge  Ears-normal pinnae, and canals, with normal landmarks and light reflex of tympanic membranes bilaterally  Nose-septum in the midline, normal mucosa bilaterally  Throat- without erythema or exudate, uvula in midline  Neck-supple without lymphadenopathy or thyromegaly; no carotid bruits  Heart- regular rate and rhythm, normal s1 and s2 without murmur or gallop; peripheral pulses 2+ and symmetrical  Lungs-clear to auscultation  Abdomen-soft, positive bowel sounds, without masses, HSmegaly or pain     Assessment/Plan     Problem List Items Addressed This Visit       Hyperlipidemia    Relevant Orders    POCT Lipid Panel manually resulted    POCT fingerstick glucose manually resulted    Osteopenia of multiple sites    Postmenopausal - Primary     Relevant Orders    XR DEXA bone density     Other Visit Diagnoses         Hammertoe of left foot        Relevant Orders    Referral to Podiatry          Considering your health conditions, I recommend that you get the RSV vaccine at your local pharmacy.    Follow up fasting (no alcohol for 48 hours and just water for 14 hours) in six months for your next routine appointment.  In general, take any medications on schedule (except for types of Insulin).      Prieto Patricia MD

## 2025-08-04 NOTE — Clinical Note
The benign nature of these lesions was discussed with the patient today and reassurance provided.   Due to irritation will treat with LN2 today  If lesion remains, instructed to contact office or topical clindamycin prescription

## 2025-08-04 NOTE — Clinical Note
Destruction of Actinic Keratosis Procedure Note  Diagnosis: let cheek AK vs folliculitis; left neck AK vs ISK   Location: see physical exam  1st lesion:  left cheek   2nd lesion:  left neck   Informed consent: Discussed risks (permanent scarring, light or dark discoloration, infection, pain, bleeding, bruising, redness, blister formation, and recurrence of the lesion) and benefits of the procedure, as well as the alternatives.  Informed consent was obtained.  Anesthesia: not required  Procedure Details:  The lesion was destroyed with liquid nitrogen. The patient tolerated procedure well.  Total number of lesions treated:  2  Plan:  The patient was instructed on post-op care. Recommend OTC analgesia as needed for pain.

## 2025-08-04 NOTE — PATIENT INSTRUCTIONS
Considering your health conditions, I recommend that you get the RSV vaccine at your local pharmacy.    Follow up fasting (no alcohol for 48 hours and just water for 14 hours) in six months for your next routine appointment.  In general, take any medications on schedule (except for types of Insulin).

## 2025-08-04 NOTE — Clinical Note
The benign nature of these lesions was discussed with the patient today and reassurance provided. No treatment is medically indicated for these lesions at this time.

## 2025-08-04 NOTE — PROGRESS NOTES
Subjective     Rosalba Lewis is a 75 y.o. female who presents for the following: Suspicious Skin Lesion (Two lesion of concern to left cheek and left side of neck. Hx of AK.  ).     Skin Lesion  She describes it as a sore, that is located on her left cheek.  It was first noticed 2 weeks ago. It has been causing no skin symptoms and is not changing. Previously this spot has not been treated.  Other spots that are concerning: left side of neck. Noticed about 3 weeks ago. Pt states she experienced some tingling at the site and is not changing. This spot has not been treated previously.     Review of Systems:  No other skin or systemic complaints other than what is documented elsewhere in the note.    The following portions of the chart were reviewed this encounter and updated as appropriate:       Skin Cancer History  Biopsy Log Book  No skin cancers from Specimen Tracking.    Additional History      Specialty Problems          Dermatology Problems    Hemangioma of skin    Lentiginosis    Photoaged skin    Seborrheic keratosis     Past Medical History:  Rosalba Lewis  has a past medical history of Arthritis (2014), Body mass index (BMI) 27.0-27.9, adult (06/24/2021), Colon polyp, Cough, unspecified (12/15/2014), Dislocation of other parts of thorax, sequela (02/10/2020), Diverticulosis, Elevated blood-pressure reading, without diagnosis of hypertension (06/14/2017), Encounter for other preprocedural examination (11/29/2021), Encounter for other preprocedural examination (05/01/2018), Low back pain, Noninfective gastroenteritis and colitis, unspecified (12/15/2014), Osteoarthritis, Other conditions influencing health status (12/11/2013), Personal history of diseases of the blood and blood-forming organs and certain disorders involving the immune mechanism (05/06/2019), Personal history of other diseases of the nervous system and sense organs, Personal history of other diseases of the respiratory system (12/15/2014),  Personal history of other diseases of the respiratory system, Personal history of other endocrine, nutritional and metabolic disease (09/09/2015), Personal history of other mental and behavioral disorders (12/12/2022), Personal history of other specified conditions (08/02/2021), Personal history of other specified conditions, Personal history of pneumonia (recurrent) (02/01/2016), Pleurodynia (07/10/2018), Right knee pain (06/02/2023), Status post left knee replacement (06/02/2023), Tinnitus, bilateral (09/05/2017), Ulcerative colitis (2014), Unspecified asthma, uncomplicated ([facility]) (01/15/2019), Urinary tract infection, site not specified (01/28/2017), and Varicella.    Past Surgical History:  Rosalba Lewis  has a past surgical history that includes Cervical biopsy w/ loop electrode excision (2016); Total knee arthroplasty (Left, 2018); Colposcopy (2024); and Knee Arthroplasty.    Family History:  Patient family history includes Alcohol abuse in her father; Arthritis in her mother, sister, and sister; Hypertension in her mother; No Known Problems in her brother; Osteoporosis in her mother; Parkinsonism in her sister.       Objective   Well appearing patient in no apparent distress; mood and affect are within normal limits.    A focused skin examination was performed. All findings within normal limits unless otherwise noted below.    Assessment/Plan   Skin Exam  1. ACTINIC KERATOSIS (2)  Left Anterior Neck, Left Buccal Cheek  Destruction of Actinic Keratosis Procedure Note  Diagnosis: let cheek AK vs folliculitis; left neck AK vs ISK   Location: see physical exam  1st lesion:  left cheek   2nd lesion:  left neck   Informed consent: Discussed risks (permanent scarring, light or dark discoloration, infection, pain, bleeding, bruising, redness, blister formation, and recurrence of the lesion) and benefits of the procedure, as well as the alternatives.  Informed consent was obtained.  Anesthesia: not  required  Procedure Details:  The lesion was destroyed with liquid nitrogen. The patient tolerated procedure well.  Total number of lesions treated:  2  Plan:  The patient was instructed on post-op care. Recommend OTC analgesia as needed for pain.    - Destr of lesion - Left Anterior Neck, Left Buccal Cheek  Complexity: simple    Destruction method: cryotherapy    Informed consent: discussed and consent obtained    Lesion destroyed using liquid nitrogen: Yes    Region frozen until ice ball extended beyond lesion: Yes    Cryotherapy cycles:  1  Outcome: patient tolerated procedure well with no complications    Post-procedure details: wound care instructions given      2. SEBORRHEIC KERATOSIS  Right Lower Back  Stuck on verrucous, tan-brown papules and plaques.    The benign nature of these lesions was discussed with the patient today and reassurance provided. No treatment is medically indicated for these lesions at this time.    Demetra Jarquin DO   Dermatology Resident, PGY-4    I saw and evaluated the patient. I personally obtained the key and critical portions of the history and physical exam or was physically present for key and critical portions performed by the student/resident. I reviewed the student/resident's documentation and discussed the patient with the student/resident. I was present for the entirety of any procedure(s). I agree with the student/resident's medical decision making as documented in the note.

## 2025-08-04 NOTE — Clinical Note
Destruction of Actinic Keratosis Procedure Note  Diagnosis: actinic keratosis  Location: 1st lesion:  left cheek  2nd lesion:  left neck   Informed consent: Discussed risks (permanent scarring, light or dark discoloration, infection, pain, bleeding, bruising, redness, blister formation, and recurrence of the lesion) and benefits of the procedure, as well as the alternatives.  Informed consent was obtained.  Anesthesia: not required  Procedure Details:  The lesion was destroyed with liquid nitrogen. The patient tolerated procedure well.  Total number of lesions treated:  2  Plan:  The patient was instructed on post-op care. Recommend OTC analgesia as needed for pain.

## 2025-08-04 NOTE — Clinical Note
The benign nature of these lesions was discussed with the patient today and reassurance provided. Due to irritation, recommend treatment with cryotherapy today     Discussed the expectations of the procedure, including redness, irritation, scabbing for the next two weeks. Discussed risks of the cryotherapy treatment, including infection. Instructed patient to use gentle cleansers on face for 2-3 weeks. Instructed not to use exfoliants or retinoids on face for the next month while the lesions heal. Patient expressed understanding.

## 2025-08-05 ENCOUNTER — APPOINTMENT (OUTPATIENT)
Dept: PRIMARY CARE | Facility: CLINIC | Age: 75
End: 2025-08-05
Payer: MEDICARE

## 2025-08-05 VITALS
BODY MASS INDEX: 22.3 KG/M2 | TEMPERATURE: 98.3 F | DIASTOLIC BLOOD PRESSURE: 68 MMHG | SYSTOLIC BLOOD PRESSURE: 135 MMHG | WEIGHT: 125.88 LBS

## 2025-08-05 DIAGNOSIS — E78.5 HYPERLIPIDEMIA, UNSPECIFIED HYPERLIPIDEMIA TYPE: ICD-10-CM

## 2025-08-05 DIAGNOSIS — M85.89 OSTEOPENIA OF MULTIPLE SITES: ICD-10-CM

## 2025-08-05 DIAGNOSIS — M20.42 HAMMERTOE OF LEFT FOOT: ICD-10-CM

## 2025-08-05 DIAGNOSIS — Z78.0 POSTMENOPAUSAL: Primary | ICD-10-CM

## 2025-08-05 LAB
POC FINGERSTICK BLOOD GLUCOSE: 86 MG/DL (ref 70–100)
POC HDL CHOLESTEROL: 66 MG/DL (ref 0–50)
POC LDL CHOLESTEROL: 58 MG/DL (ref 0–100)
POC NON-HDL CHOLESTEROL: 68 MG/DL (ref 0–130)
POC TOTAL CHOLESTEROL/HDL RATIO: 2 (ref 0–4.5)
POC TOTAL CHOLESTEROL: 134 MG/DL (ref 0–199)
POC TRIGLYCERIDES: 50 MG/DL (ref 0–150)

## 2025-08-05 PROCEDURE — 1159F MED LIST DOCD IN RCRD: CPT | Performed by: FAMILY MEDICINE

## 2025-08-05 PROCEDURE — 1160F RVW MEDS BY RX/DR IN RCRD: CPT | Performed by: FAMILY MEDICINE

## 2025-08-05 PROCEDURE — 80061 LIPID PANEL: CPT | Performed by: FAMILY MEDICINE

## 2025-08-05 PROCEDURE — 99214 OFFICE O/P EST MOD 30 MIN: CPT | Performed by: FAMILY MEDICINE

## 2025-08-05 PROCEDURE — 82962 GLUCOSE BLOOD TEST: CPT | Performed by: FAMILY MEDICINE

## 2025-08-16 ENCOUNTER — OFFICE VISIT (OUTPATIENT)
Dept: URGENT CARE | Age: 75
End: 2025-08-16
Payer: MEDICARE

## 2025-08-16 VITALS
HEIGHT: 63 IN | OXYGEN SATURATION: 98 % | BODY MASS INDEX: 21.97 KG/M2 | HEART RATE: 87 BPM | TEMPERATURE: 98.5 F | SYSTOLIC BLOOD PRESSURE: 134 MMHG | DIASTOLIC BLOOD PRESSURE: 79 MMHG | WEIGHT: 124 LBS | RESPIRATION RATE: 20 BRPM

## 2025-08-16 DIAGNOSIS — R30.0 BURNING WITH URINATION: Primary | ICD-10-CM

## 2025-08-16 DIAGNOSIS — N30.01 ACUTE CYSTITIS WITH HEMATURIA: ICD-10-CM

## 2025-08-16 LAB
POC APPEARANCE, URINE: ABNORMAL
POC BILIRUBIN, URINE: NEGATIVE
POC BLOOD, URINE: ABNORMAL
POC COLOR, URINE: YELLOW
POC GLUCOSE, URINE: NEGATIVE MG/DL
POC KETONES, URINE: NEGATIVE MG/DL
POC LEUKOCYTES, URINE: ABNORMAL
POC NITRITE,URINE: NEGATIVE
POC PH, URINE: 6 PH
POC PROTEIN, URINE: ABNORMAL MG/DL
POC SPECIFIC GRAVITY, URINE: 1.02
POC UROBILINOGEN, URINE: 0.2 EU/DL

## 2025-08-16 RX ORDER — PHENAZOPYRIDINE HYDROCHLORIDE 100 MG/1
100 TABLET, FILM COATED ORAL 3 TIMES DAILY PRN
Qty: 9 TABLET | Refills: 0 | Status: SHIPPED | OUTPATIENT
Start: 2025-08-16 | End: 2025-08-19

## 2025-08-16 RX ORDER — SULFAMETHOXAZOLE AND TRIMETHOPRIM 800; 160 MG/1; MG/1
1 TABLET ORAL 2 TIMES DAILY
Qty: 10 TABLET | Refills: 0 | Status: SHIPPED | OUTPATIENT
Start: 2025-08-16 | End: 2025-08-21

## 2025-08-16 ASSESSMENT — ENCOUNTER SYMPTOMS
GASTROINTESTINAL NEGATIVE: 1
EYES NEGATIVE: 1
PALPITATIONS: 0
ENDOCRINE NEGATIVE: 1
FREQUENCY: 1
PSYCHIATRIC NEGATIVE: 1
CONSTITUTIONAL NEGATIVE: 1
NEUROLOGICAL NEGATIVE: 1
RESPIRATORY NEGATIVE: 1
DYSURIA: 1
HEMATOLOGIC/LYMPHATIC NEGATIVE: 1
MUSCULOSKELETAL NEGATIVE: 1
ALLERGIC/IMMUNOLOGIC NEGATIVE: 1

## 2025-08-19 LAB — BACTERIA UR CULT: ABNORMAL

## 2025-08-20 ENCOUNTER — TELEPHONE (OUTPATIENT)
Dept: URGENT CARE | Age: 75
End: 2025-08-20

## 2025-10-21 ENCOUNTER — APPOINTMENT (OUTPATIENT)
Dept: RHEUMATOLOGY | Facility: CLINIC | Age: 75
End: 2025-10-21
Payer: MEDICARE

## 2025-11-13 ENCOUNTER — APPOINTMENT (OUTPATIENT)
Dept: PODIATRY | Facility: CLINIC | Age: 75
End: 2025-11-13
Payer: MEDICARE

## 2025-11-20 ENCOUNTER — APPOINTMENT (OUTPATIENT)
Dept: UROLOGY | Facility: CLINIC | Age: 75
End: 2025-11-20
Payer: MEDICARE

## 2025-11-25 ENCOUNTER — APPOINTMENT (OUTPATIENT)
Dept: INFUSION THERAPY | Facility: CLINIC | Age: 75
End: 2025-11-25
Payer: MEDICARE

## 2025-12-09 ENCOUNTER — APPOINTMENT (OUTPATIENT)
Dept: PHARMACY | Facility: HOSPITAL | Age: 75
End: 2025-12-09
Payer: MEDICARE

## 2025-12-12 ENCOUNTER — APPOINTMENT (OUTPATIENT)
Dept: DERMATOLOGY | Facility: CLINIC | Age: 75
End: 2025-12-12
Payer: MEDICARE

## 2026-02-10 ENCOUNTER — APPOINTMENT (OUTPATIENT)
Dept: PRIMARY CARE | Facility: CLINIC | Age: 76
End: 2026-02-10
Payer: MEDICARE